# Patient Record
Sex: FEMALE | Race: WHITE | Employment: FULL TIME | URBAN - METROPOLITAN AREA
[De-identification: names, ages, dates, MRNs, and addresses within clinical notes are randomized per-mention and may not be internally consistent; named-entity substitution may affect disease eponyms.]

---

## 2019-05-24 ENCOUNTER — HOSPITAL ENCOUNTER (OUTPATIENT)
Dept: MAMMOGRAPHY | Age: 49
Discharge: HOME | End: 2019-05-24
Attending: NURSE PRACTITIONER
Payer: COMMERCIAL

## 2019-05-24 DIAGNOSIS — Z12.31 VISIT FOR SCREENING MAMMOGRAM: ICD-10-CM

## 2019-05-24 PROCEDURE — 87624 HPV HI-RISK TYP POOLED RSLT: CPT | Performed by: NURSE PRACTITIONER

## 2019-05-24 PROCEDURE — 88175 CYTOPATH C/V AUTO FLUID REDO: CPT | Performed by: NURSE PRACTITIONER

## 2019-05-24 PROCEDURE — 77063 BREAST TOMOSYNTHESIS BI: CPT | Performed by: NURSE PRACTITIONER

## 2019-05-24 PROCEDURE — 77067 SCR MAMMO BI INCL CAD: CPT | Performed by: NURSE PRACTITIONER

## 2019-07-17 ENCOUNTER — HOSPITAL ENCOUNTER (OUTPATIENT)
Dept: MAMMOGRAPHY | Facility: HOSPITAL | Age: 49
Discharge: HOME | End: 2019-07-17
Attending: NURSE PRACTITIONER
Payer: COMMERCIAL

## 2019-07-17 DIAGNOSIS — R92.2 INCONCLUSIVE MAMMOGRAM: ICD-10-CM

## 2019-07-17 PROCEDURE — 77065 DX MAMMO INCL CAD UNI: CPT | Performed by: NURSE PRACTITIONER

## 2019-07-17 PROCEDURE — 76642 ULTRASOUND BREAST LIMITED: CPT | Performed by: NURSE PRACTITIONER

## 2019-07-17 PROCEDURE — 77061 BREAST TOMOSYNTHESIS UNI: CPT | Performed by: NURSE PRACTITIONER

## 2020-06-26 DIAGNOSIS — Z78.9 PARTICIPANT IN HEALTH AND WELLNESS PLAN: Primary | ICD-10-CM

## 2020-07-02 ENCOUNTER — NURSE ONLY (OUTPATIENT)
Dept: LAB | Age: 50
End: 2020-07-02
Attending: PREVENTIVE MEDICINE
Payer: COMMERCIAL

## 2020-07-02 DIAGNOSIS — Z78.9 PARTICIPANT IN HEALTH AND WELLNESS PLAN: ICD-10-CM

## 2020-07-02 LAB — SARS-COV-2 IGG SERPLBLD QL IA.RAPID: NEGATIVE

## 2020-07-02 PROCEDURE — 86769 SARS-COV-2 COVID-19 ANTIBODY: CPT

## 2020-08-01 ENCOUNTER — HOSPITAL ENCOUNTER (OUTPATIENT)
Dept: MAMMOGRAPHY | Facility: HOSPITAL | Age: 50
Discharge: HOME | End: 2020-08-01
Attending: NURSE PRACTITIONER
Payer: COMMERCIAL

## 2020-08-01 DIAGNOSIS — Z12.31 ENCOUNTER FOR SCREENING MAMMOGRAM FOR MALIGNANT NEOPLASM OF BREAST: ICD-10-CM

## 2020-08-06 ENCOUNTER — HOSPITAL ENCOUNTER (OUTPATIENT)
Dept: MAMMOGRAPHY | Facility: HOSPITAL | Age: 50
Discharge: HOME | End: 2020-08-06
Attending: NURSE PRACTITIONER
Payer: COMMERCIAL

## 2020-08-06 PROCEDURE — 77063 BREAST TOMOSYNTHESIS BI: CPT | Performed by: NURSE PRACTITIONER

## 2020-08-06 PROCEDURE — 77067 SCR MAMMO BI INCL CAD: CPT | Performed by: NURSE PRACTITIONER

## 2020-09-15 ENCOUNTER — APPOINTMENT (OUTPATIENT)
Dept: GENERAL RADIOLOGY | Facility: HOSPITAL | Age: 50
End: 2020-09-15
Attending: EMERGENCY MEDICINE
Payer: COMMERCIAL

## 2020-09-15 ENCOUNTER — HOSPITAL ENCOUNTER (EMERGENCY)
Facility: HOSPITAL | Age: 50
Discharge: HOME | End: 2020-09-15
Attending: EMERGENCY MEDICINE
Payer: COMMERCIAL

## 2020-09-15 VITALS
TEMPERATURE: 97 F | HEIGHT: 63.6 IN | OXYGEN SATURATION: 100 % | HEART RATE: 56 BPM | DIASTOLIC BLOOD PRESSURE: 90 MMHG | BODY MASS INDEX: 29.38 KG/M2 | WEIGHT: 170 LBS | SYSTOLIC BLOOD PRESSURE: 157 MMHG | RESPIRATION RATE: 16 BRPM

## 2020-09-15 DIAGNOSIS — S63.91XA SPRAIN OF RIGHT HAND, INITIAL ENCOUNTER: Primary | ICD-10-CM

## 2020-09-15 PROCEDURE — 99283 EMERGENCY DEPT VISIT LOW MDM: CPT

## 2020-09-15 PROCEDURE — 73130 X-RAY EXAM OF HAND: CPT | Performed by: EMERGENCY MEDICINE

## 2020-09-15 RX ORDER — IBUPROFEN 600 MG/1
600 TABLET ORAL EVERY 8 HOURS PRN
Qty: 30 TABLET | Refills: 0 | Status: SHIPPED | OUTPATIENT
Start: 2020-09-15 | End: 2020-09-25

## 2020-09-15 NOTE — ED PROVIDER NOTES
Patient Seen in: [facility] Emergency Department      History   Patient presents with:  Upper Extremity Injury    Stated Complaint: fall right hand pain    HPI    47-year-old female presents emergency department complaining of right first hand and s Stethoscope and any equipment used during my examination was cleaned with super sani-cloth germicidal wipes following the exam.         Vital signs reviewed  General appearance: Patient is alert and in no acute distress  HEENT: Pupils equal react to light x-ray showed no acute fracture. Will put her in a wrist splint with thumb spica for some support as I do feel she strained her ligament in her thumb.   Patient will continue to take anti-inflammatories supportive care        MDM     Patient was made aware

## 2020-12-19 ENCOUNTER — IMMUNIZATION (OUTPATIENT)
Dept: LAB | Facility: HOSPITAL | Age: 50
End: 2020-12-19
Attending: PREVENTIVE MEDICINE
Payer: COMMERCIAL

## 2020-12-19 DIAGNOSIS — Z23 NEED FOR VACCINATION: ICD-10-CM

## 2020-12-19 PROCEDURE — 0001A PFIZER-BIONTECH COVID-19 VACCINE: CPT

## 2021-01-09 ENCOUNTER — IMMUNIZATION (OUTPATIENT)
Dept: LAB | Facility: HOSPITAL | Age: 51
End: 2021-01-09
Attending: PREVENTIVE MEDICINE

## 2021-01-09 DIAGNOSIS — Z23 NEED FOR VACCINATION: ICD-10-CM

## 2021-01-09 PROCEDURE — 0002A SARSCOV2 VAC 30MCG/0.3ML IM: CPT

## 2021-09-21 ENCOUNTER — HOSPITAL ENCOUNTER (OUTPATIENT)
Dept: GENERAL RADIOLOGY | Age: 51
Discharge: HOME | End: 2021-09-21
Attending: FAMILY MEDICINE
Payer: COMMERCIAL

## 2021-09-21 ENCOUNTER — LAB ENCOUNTER (OUTPATIENT)
Dept: LAB | Age: 51
End: 2021-09-21
Attending: FAMILY MEDICINE
Payer: COMMERCIAL

## 2021-09-21 DIAGNOSIS — Z00.00 ROUTINE GENERAL MEDICAL EXAMINATION AT A HEALTH CARE FACILITY: ICD-10-CM

## 2021-09-21 DIAGNOSIS — M25.542 ARTHRALGIA OF BOTH HANDS: ICD-10-CM

## 2021-09-21 DIAGNOSIS — R53.83 OTHER FATIGUE: ICD-10-CM

## 2021-09-21 DIAGNOSIS — M25.541 ARTHRALGIA OF BOTH HANDS: ICD-10-CM

## 2021-09-21 DIAGNOSIS — E55.9 VITAMIN D DEFICIENCY: ICD-10-CM

## 2021-09-21 LAB
ALT SERPL-CCNC: 24 U/L
ANION GAP SERPL CALC-SCNC: 4 MMOL/L (ref 0–18)
BUN BLD-MCNC: 16 MG/DL (ref 7–18)
CALCIUM BLD-MCNC: 9 MG/DL (ref 8.5–10.1)
CHLORIDE SERPL-SCNC: 107 MMOL/L (ref 98–112)
CHOLEST SERPL-MCNC: 220 MG/DL (ref ?–200)
CO2 SERPL-SCNC: 26 MMOL/L (ref 21–32)
CREAT BLD-MCNC: 0.78 MG/DL
CRP SERPL-MCNC: 0.36 MG/DL (ref ?–0.3)
ERYTHROCYTE [DISTWIDTH] IN BLOOD BY AUTOMATED COUNT: 14 %
GLUCOSE BLD-MCNC: 115 MG/DL (ref 70–99)
HCT VFR BLD AUTO: 43.9 %
HDLC SERPL-MCNC: 37 MG/DL (ref 40–59)
HGB BLD-MCNC: 14.6 G/DL
LDLC SERPL CALC-MCNC: 136 MG/DL (ref ?–100)
MCH RBC QN AUTO: 29 PG (ref 26–34)
MCHC RBC AUTO-ENTMCNC: 33.3 G/DL (ref 31–37)
MCV RBC AUTO: 87.1 FL
NONHDLC SERPL-MCNC: 183 MG/DL (ref ?–130)
OSMOLALITY SERPL CALC.SUM OF ELEC: 286 MOSM/KG (ref 275–295)
PATIENT FASTING Y/N/NP: YES
PATIENT FASTING Y/N/NP: YES
PLATELET # BLD AUTO: 296 10(3)UL (ref 150–450)
POTASSIUM SERPL-SCNC: 4.3 MMOL/L (ref 3.5–5.1)
RBC # BLD AUTO: 5.04 X10(6)UL
SED RATE-ML: 19 MM/HR
SODIUM SERPL-SCNC: 137 MMOL/L (ref 136–145)
TRIGL SERPL-MCNC: 260 MG/DL (ref 30–149)
VIT D+METAB SERPL-MCNC: 25.5 NG/ML (ref 30–100)
VLDLC SERPL CALC-MCNC: 48 MG/DL (ref 0–30)
WBC # BLD AUTO: 6.2 X10(3) UL (ref 4–11)

## 2021-09-21 PROCEDURE — 85027 COMPLETE CBC AUTOMATED: CPT

## 2021-09-21 PROCEDURE — 82306 VITAMIN D 25 HYDROXY: CPT

## 2021-09-21 PROCEDURE — 85652 RBC SED RATE AUTOMATED: CPT

## 2021-09-21 PROCEDURE — 36415 COLL VENOUS BLD VENIPUNCTURE: CPT

## 2021-09-21 PROCEDURE — 73130 X-RAY EXAM OF HAND: CPT | Performed by: FAMILY MEDICINE

## 2021-09-21 PROCEDURE — 80048 BASIC METABOLIC PNL TOTAL CA: CPT

## 2021-09-21 PROCEDURE — 80061 LIPID PANEL: CPT

## 2021-09-21 PROCEDURE — 84460 ALANINE AMINO (ALT) (SGPT): CPT

## 2021-09-21 PROCEDURE — 86140 C-REACTIVE PROTEIN: CPT

## 2021-12-14 ENCOUNTER — HOSPITAL ENCOUNTER (OUTPATIENT)
Dept: MAMMOGRAPHY | Age: 51
Discharge: HOME | End: 2021-12-14
Attending: NURSE PRACTITIONER
Payer: COMMERCIAL

## 2021-12-14 ENCOUNTER — LAB ENCOUNTER (OUTPATIENT)
Dept: LAB | Age: 51
End: 2021-12-14
Attending: FAMILY MEDICINE
Payer: COMMERCIAL

## 2021-12-14 DIAGNOSIS — Z12.31 ENCOUNTER FOR SCREENING MAMMOGRAM FOR MALIGNANT NEOPLASM OF BREAST: ICD-10-CM

## 2021-12-14 DIAGNOSIS — E55.9 VITAMIN D DEFICIENCY: ICD-10-CM

## 2021-12-14 DIAGNOSIS — R79.9 ABNORMAL BLOOD CHEMISTRY: ICD-10-CM

## 2021-12-14 PROCEDURE — 86140 C-REACTIVE PROTEIN: CPT

## 2021-12-14 PROCEDURE — 77063 BREAST TOMOSYNTHESIS BI: CPT | Performed by: NURSE PRACTITIONER

## 2021-12-14 PROCEDURE — 82306 VITAMIN D 25 HYDROXY: CPT

## 2021-12-14 PROCEDURE — 83036 HEMOGLOBIN GLYCOSYLATED A1C: CPT

## 2021-12-14 PROCEDURE — 36415 COLL VENOUS BLD VENIPUNCTURE: CPT

## 2021-12-14 PROCEDURE — 82947 ASSAY GLUCOSE BLOOD QUANT: CPT

## 2021-12-14 PROCEDURE — 77067 SCR MAMMO BI INCL CAD: CPT | Performed by: NURSE PRACTITIONER

## 2022-01-11 ENCOUNTER — LAB ENCOUNTER (OUTPATIENT)
Dept: LAB | Facility: HOSPITAL | Age: 52
End: 2022-01-11
Attending: PREVENTIVE MEDICINE
Payer: COMMERCIAL

## 2022-01-11 ENCOUNTER — TELEPHONE (OUTPATIENT)
Dept: INTERNAL MEDICINE CLINIC | Facility: HOSPITAL | Age: 52
End: 2022-01-11

## 2022-01-11 DIAGNOSIS — Z20.822 SUSPECTED COVID-19 VIRUS INFECTION: ICD-10-CM

## 2022-01-11 DIAGNOSIS — Z20.822 SUSPECTED COVID-19 VIRUS INFECTION: Primary | ICD-10-CM

## 2022-01-11 LAB — SARS-COV-2 RNA RESP QL NAA+PROBE: DETECTED

## 2022-01-11 NOTE — TELEPHONE ENCOUNTER
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 days  When was the last shift you worked? 1/10/2022  When are you next scheduled to work?  1/14/2022    Did you have close contact with someone on your unit while not wearing a mask? (e.g., kristal Follow up for condition update when resulting  []  If symptoms develop, stay home and call hotline for rapid test order  [x]  If COVID positive results, off work minimum of 5 days from positive test or onset of symptoms (day 0)    [x]      On day 5, if asy

## 2022-01-12 NOTE — TELEPHONE ENCOUNTER
Left VM on 1/12/22 at 0840 to go over RTW instructions. Results and RTW guidelines:    COVID RESULT:    [x] Viewed by employee in [address] RTW plan and instructions as indicated on triage call. Manager notified.   Estimated RTW date: 1/16/22  [x] Disc if asymptomatic or mildly symptomatic (with improving symptoms) may return to work day 6          On day 5, if symptomatic, call Employee Health for RTW screening        []  COVID positive result - call Employee Health on day 5 after symptom onset.   The em

## 2022-05-20 ENCOUNTER — LAB ENCOUNTER (OUTPATIENT)
Dept: LAB | Facility: HOSPITAL | Age: 52
End: 2022-05-20
Attending: INTERNAL MEDICINE
Payer: COMMERCIAL

## 2022-05-20 DIAGNOSIS — Z01.818 PREOP TESTING: ICD-10-CM

## 2022-05-20 LAB — SARS-COV-2 RNA RESP QL NAA+PROBE: NOT DETECTED

## 2022-05-23 PROBLEM — Z12.11 SPECIAL SCREENING FOR MALIGNANT NEOPLASMS, COLON: Status: ACTIVE | Noted: 2022-05-23

## 2022-05-23 PROBLEM — K64.8 INTERNAL HEMORRHOIDS: Status: ACTIVE | Noted: 2022-05-23

## 2023-01-10 ENCOUNTER — OFFICE VISIT (OUTPATIENT)
Dept: PODIATRY CLINIC | Facility: CLINIC | Age: 53
End: 2023-01-10
Payer: COMMERCIAL

## 2023-01-10 DIAGNOSIS — M65.28 CALCIFIC ACHILLES TENDINITIS OF LEFT LOWER EXTREMITY: ICD-10-CM

## 2023-01-10 DIAGNOSIS — M77.52 RETROCALCANEAL BURSITIS, LEFT: ICD-10-CM

## 2023-01-10 DIAGNOSIS — M79.672 LEFT FOOT PAIN: Primary | ICD-10-CM

## 2023-01-10 DIAGNOSIS — M79.672 INFLAMMATORY HEEL PAIN, LEFT: ICD-10-CM

## 2023-01-10 RX ORDER — METHYLPREDNISOLONE 4 MG/1
TABLET ORAL
Qty: 21 TABLET | Refills: 0 | Status: SHIPPED | OUTPATIENT
Start: 2023-01-10

## 2023-02-01 ENCOUNTER — TELEPHONE (OUTPATIENT)
Dept: PHYSICAL THERAPY | Facility: HOSPITAL | Age: 53
End: 2023-02-01

## 2023-02-27 ENCOUNTER — TELEPHONE (OUTPATIENT)
Dept: PHYSICAL THERAPY | Facility: HOSPITAL | Age: 53
End: 2023-02-27

## 2023-02-28 ENCOUNTER — TELEPHONE (OUTPATIENT)
Dept: PHYSICAL THERAPY | Facility: HOSPITAL | Age: 53
End: 2023-02-28

## 2023-02-28 ENCOUNTER — OFFICE VISIT (OUTPATIENT)
Dept: PHYSICAL THERAPY | Facility: HOSPITAL | Age: 53
End: 2023-02-28
Attending: PODIATRIST
Payer: COMMERCIAL

## 2023-02-28 DIAGNOSIS — M79.672 LEFT FOOT PAIN: ICD-10-CM

## 2023-02-28 DIAGNOSIS — M77.52 RETROCALCANEAL BURSITIS, LEFT: ICD-10-CM

## 2023-02-28 DIAGNOSIS — M79.672 INFLAMMATORY HEEL PAIN, LEFT: ICD-10-CM

## 2023-02-28 DIAGNOSIS — M65.28 CALCIFIC ACHILLES TENDINITIS OF LEFT LOWER EXTREMITY: ICD-10-CM

## 2023-02-28 PROCEDURE — 97162 PT EVAL MOD COMPLEX 30 MIN: CPT

## 2023-02-28 PROCEDURE — 97110 THERAPEUTIC EXERCISES: CPT

## 2023-03-02 ENCOUNTER — APPOINTMENT (OUTPATIENT)
Dept: PHYSICAL THERAPY | Facility: HOSPITAL | Age: 53
End: 2023-03-02
Attending: PODIATRIST
Payer: COMMERCIAL

## 2023-03-02 ENCOUNTER — OFFICE VISIT (OUTPATIENT)
Dept: PHYSICAL THERAPY | Facility: HOSPITAL | Age: 53
End: 2023-03-02
Attending: FAMILY MEDICINE
Payer: COMMERCIAL

## 2023-03-02 PROCEDURE — 97110 THERAPEUTIC EXERCISES: CPT

## 2023-03-02 PROCEDURE — 97140 MANUAL THERAPY 1/> REGIONS: CPT

## 2023-03-07 ENCOUNTER — OFFICE VISIT (OUTPATIENT)
Dept: PHYSICAL THERAPY | Facility: HOSPITAL | Age: 53
End: 2023-03-07
Attending: PODIATRIST
Payer: COMMERCIAL

## 2023-03-07 PROCEDURE — 97110 THERAPEUTIC EXERCISES: CPT

## 2023-03-07 PROCEDURE — 97140 MANUAL THERAPY 1/> REGIONS: CPT

## 2023-03-09 ENCOUNTER — OFFICE VISIT (OUTPATIENT)
Dept: PHYSICAL THERAPY | Facility: HOSPITAL | Age: 53
End: 2023-03-09
Attending: PODIATRIST
Payer: COMMERCIAL

## 2023-03-09 PROCEDURE — 97110 THERAPEUTIC EXERCISES: CPT

## 2023-03-09 PROCEDURE — 97140 MANUAL THERAPY 1/> REGIONS: CPT

## 2023-03-09 NOTE — ED INITIAL ASSESSMENT (HPI)
Pt here with right first and second digit pain after falling while feeding the dogs. Denies hitting head denies loc.no deformity noted. ice pack intact.  Ibuprofen 800mg PO at home Detail Level: Zone Text: The above diagnosis and findings were noted on the exam but not discussed at this time with the patient.

## 2023-03-14 ENCOUNTER — TELEPHONE (OUTPATIENT)
Dept: PHYSICAL THERAPY | Facility: HOSPITAL | Age: 53
End: 2023-03-14

## 2023-03-14 ENCOUNTER — APPOINTMENT (OUTPATIENT)
Dept: PHYSICAL THERAPY | Facility: HOSPITAL | Age: 53
End: 2023-03-14
Attending: PODIATRIST
Payer: COMMERCIAL

## 2023-03-21 ENCOUNTER — OFFICE VISIT (OUTPATIENT)
Dept: PHYSICAL THERAPY | Facility: HOSPITAL | Age: 53
End: 2023-03-21
Attending: PODIATRIST
Payer: COMMERCIAL

## 2023-03-21 PROCEDURE — 97110 THERAPEUTIC EXERCISES: CPT

## 2023-03-21 PROCEDURE — 97140 MANUAL THERAPY 1/> REGIONS: CPT

## 2023-03-23 ENCOUNTER — OFFICE VISIT (OUTPATIENT)
Dept: PHYSICAL THERAPY | Facility: HOSPITAL | Age: 53
End: 2023-03-23
Attending: PODIATRIST
Payer: COMMERCIAL

## 2023-03-23 PROCEDURE — 97110 THERAPEUTIC EXERCISES: CPT

## 2023-06-21 ENCOUNTER — HOSPITAL ENCOUNTER (OUTPATIENT)
Dept: MAMMOGRAPHY | Age: 53
Discharge: HOME | End: 2023-06-21
Payer: COMMERCIAL

## 2023-06-21 DIAGNOSIS — Z12.31 ENCOUNTER FOR SCREENING MAMMOGRAM FOR BREAST CANCER: ICD-10-CM

## 2023-06-21 PROCEDURE — 87624 HPV HI-RISK TYP POOLED RSLT: CPT

## 2023-06-21 PROCEDURE — 77063 BREAST TOMOSYNTHESIS BI: CPT

## 2023-06-21 PROCEDURE — 77067 SCR MAMMO BI INCL CAD: CPT

## 2024-05-08 ENCOUNTER — OFFICE VISIT (OUTPATIENT)
Dept: PODIATRY CLINIC | Facility: CLINIC | Age: 54
End: 2024-05-08

## 2024-05-08 DIAGNOSIS — M65.28 CALCIFIC ACHILLES TENDINITIS OF LEFT LOWER EXTREMITY: Primary | ICD-10-CM

## 2024-05-08 DIAGNOSIS — M77.52 RETROCALCANEAL BURSITIS, LEFT: ICD-10-CM

## 2024-05-08 DIAGNOSIS — M79.672 LEFT FOOT PAIN: ICD-10-CM

## 2024-05-08 PROCEDURE — 99213 OFFICE O/P EST LOW 20 MIN: CPT | Performed by: STUDENT IN AN ORGANIZED HEALTH CARE EDUCATION/TRAINING PROGRAM

## 2024-05-10 ENCOUNTER — TELEPHONE (OUTPATIENT)
Dept: PODIATRY CLINIC | Facility: CLINIC | Age: 54
End: 2024-05-10

## 2024-05-10 NOTE — TELEPHONE ENCOUNTER
Per patient Dr. Kang wants her to get an mri of her foot and central scheduling does not have an order. Please advise

## 2024-05-13 NOTE — PROGRESS NOTES
[facility] Podiatry  Progress Note    Kristen Hooks is a 53 year old female.   Chief Complaint   Patient presents with    Heel Pain     Consult- L heel - onset- 2 yrs ago- foot hit by a trailer- was seen by PT with improvement but still painful- rates pain 10/10 on and off- was seen by Dr. Ernandez in 2023         HPI:     Patient is a very pleasant 53-year-old female presents to clinic for evaluation of chronic left heel pain.  She has pain in her Achilles tendon insertion has been ongoing for the last 2+ years.  Her foot was hit by a trailer in the past.  She has completed physical therapy in the past but her symptoms remain painful.  She cannot complete activities that she would like to.  She rates her pain a 10 out of 10 on and off.  She does feel limited by her continued pain and is wondering what other treatment options are available.  No other complaints are mentioned.  Past medical history, medications, and allergies reviewed.      Allergies: Flu virus vaccine   Current Outpatient Medications   Medication Sig Dispense Refill    methylPREDNISolone 4 MG Oral Tablet Therapy Pack Take per package insert (instructions). 21 tablet 0    ergocalciferol 1.25 MG (98870 UT) Oral Cap Take 1 capsule (50,000 Units total) by mouth once a week. (Patient not taking: Reported on 1/10/2023) 4 capsule 2    sertraline 50 MG Oral Tab Take 1 tablet (50 mg total) by mouth daily. 90 tablet 3    Cholecalciferol (VITAMIN D) 50 MCG (2000 UT) Oral Cap Take by mouth.        Past Medical History:    Anxiety    H. pylori infection    Headache disorder    Heartburn    Hemorrhoids    Stress    Wears glasses      Past Surgical History:   Procedure Laterality Date    Other surgical history  2022    colonscopy      Family History   Problem Relation Age of Onset    Heart Disorder Father     Diabetes Father     Heart Attack Father     Other (Other) Father 69        parkinsons    Cancer Mother         non-hodgkins lymphoma     Hypertension Mother     Prostate Cancer Brother       Social History     Socioeconomic History    Marital status:    Tobacco Use    Smoking status: Never    Smokeless tobacco: Never   Vaping Use    Vaping status: Never Used   Substance and Sexual Activity    Alcohol use: Yes     Alcohol/week: 3.0 standard drinks of alcohol     Types: 3 Cans of beer per week    Drug use: No    Sexual activity: Yes     Partners: Male           REVIEW OF SYSTEMS:     Today reviewed systems as documented below  GENERAL HEALTH: feels well otherwise  SKIN: denies any unusual skin lesions or rashes  RESPIRATORY: denies shortness of breath with exertion  CARDIOVASCULAR: denies chest pain on exertion  GI: denies abdominal pain and denies heartburn  NEURO: denies headaches  MUSCULO: denies arthritis, back pain      EXAM:   LMP 05/20/2023 (Approximate)   GENERAL: well developed, well nourished, in no apparent distress  EXTREMITIES:   1. Integument: Normal skin temperature and turgor  2. Vascular: Dorsalis pedis two out of four bilateral and posterior tibial pulses two out of   four bilateral, capillary refill normal.   3. Musculoskeletal: All muscle groups are graded 5 out of 5 in the foot and ankle.  Pain noted to Achilles tendon insertion of left lower extremity.  Palpable exostosis and posterior superior lateral eminence to the left calcaneus.  Compartments are soft and compressible.  No palpable dell or strength deficits.   4. Neurological: Normal sharp dull sensation; reflexes normal.    Left foot x-rays: 5/8/2024:    Insertional enthesophyte to posterior calcaneus with additional posterior superior eminence consistent with Elliot's deformity. No acute fracture or osseous abnormality appreciated.        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  -Discussed etiology of condition and various treatment options.  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  She has continued pain despite conservative treatment options such as physical therapy, activity modification, anti-inflammatories, and shoe gear modification.  -X-rays do show insertional enthesophyte and Elliot's deformity.  -Will order MRI for further evaluation.  -Because of patient's fairly significant pain has been ongoing for 2+ years, she may benefit from surgical intervention.  Discussed Achilles detach/reattach with calcaneal exostectomy including benefits, risks, recovery period.  -Patient may be interested in this.  Will further discuss once MRI is complete.  -All questions were answered to satisfaction.    The patient indicates understanding of these issues and agrees to the plan.        Alexei Kang DPM    Dragon speech recognition software was used to prepare this note.  Errors in word recognition may occur.  Please contact me with any questions/concerns with this note.

## 2024-05-15 ENCOUNTER — HOSPITAL ENCOUNTER (OUTPATIENT)
Dept: MRI IMAGING | Age: 54
Discharge: HOME | End: 2024-05-15
Attending: STUDENT IN AN ORGANIZED HEALTH CARE EDUCATION/TRAINING PROGRAM

## 2024-05-15 DIAGNOSIS — M77.52 RETROCALCANEAL BURSITIS, LEFT: ICD-10-CM

## 2024-05-15 DIAGNOSIS — M65.28 CALCIFIC ACHILLES TENDINITIS OF LEFT LOWER EXTREMITY: ICD-10-CM

## 2024-05-15 PROCEDURE — 73721 MRI JNT OF LWR EXTRE W/O DYE: CPT | Performed by: STUDENT IN AN ORGANIZED HEALTH CARE EDUCATION/TRAINING PROGRAM

## 2024-05-23 ENCOUNTER — TELEPHONE (OUTPATIENT)
Dept: PODIATRY CLINIC | Facility: CLINIC | Age: 54
End: 2024-05-23

## 2024-05-23 DIAGNOSIS — M79.672 INFLAMMATORY HEEL PAIN, LEFT: ICD-10-CM

## 2024-05-23 DIAGNOSIS — M65.28 CALCIFIC ACHILLES TENDINITIS OF LEFT LOWER EXTREMITY: Primary | ICD-10-CM

## 2024-05-23 DIAGNOSIS — M77.52 RETROCALCANEAL BURSITIS, LEFT: ICD-10-CM

## 2024-05-23 DIAGNOSIS — M92.62 HAGLUND'S DEFORMITY, LEFT: ICD-10-CM

## 2024-05-24 NOTE — TELEPHONE ENCOUNTER
Procedure: Achilles detach/reattach with calcaneal exostectomy  CPT code: 84497  Length of Surgery: 2 hours  Any Instruments: podiatry tray, small power, mini fluoro, harvey speedbridge  Call patient: ASAP  Anesthesia: Gen  will need to be prone  Location: [facility]  Assistance: none  Pacemaker: No  Anticoagulants: No  Nickel Allergy: No  Latex Allergy: No  Diagnosis/ICD Code:     ICD-10-CM    1. 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 Retrocalcaneal bursitis, left  M77.52       3. Inflammatory heel pain, left  M79.672       4. Elliot's deformity, left  M92.62

## 2024-05-30 DIAGNOSIS — M92.62 HAGLUND'S DEFORMITY, LEFT: ICD-10-CM

## 2024-05-30 DIAGNOSIS — M65.28 CALCIFIC ACHILLES TENDINITIS OF LEFT LOWER EXTREMITY: Primary | ICD-10-CM

## 2024-05-30 DIAGNOSIS — M77.52 RETROCALCANEAL BURSITIS, LEFT: ICD-10-CM

## 2024-05-30 DIAGNOSIS — M79.672 INFLAMMATORY HEEL PAIN, LEFT: ICD-10-CM

## 2024-06-20 ENCOUNTER — LAB ENCOUNTER (OUTPATIENT)
Dept: LAB | Facility: HOSPITAL | Age: 54
End: 2024-06-20
Attending: FAMILY MEDICINE

## 2024-06-20 DIAGNOSIS — Z13.220 SCREENING FOR LIPOID DISORDERS: ICD-10-CM

## 2024-06-20 DIAGNOSIS — Z13.228 SCREENING FOR PHENYLKETONURIA (PKU): Primary | ICD-10-CM

## 2024-06-20 DIAGNOSIS — Z13.29 SCREENING FOR THYROID DISORDER: ICD-10-CM

## 2024-06-20 DIAGNOSIS — Z13.0 SCREENING FOR IRON DEFICIENCY ANEMIA: ICD-10-CM

## 2024-06-20 DIAGNOSIS — E55.9 AVITAMINOSIS D: ICD-10-CM

## 2024-06-20 LAB
ALBUMIN SERPL-MCNC: 3.8 G/DL (ref 3.4–5)
ALBUMIN/GLOB SERPL: 0.9 {RATIO} (ref 1–2)
ALP LIVER SERPL-CCNC: 105 U/L
ALT SERPL-CCNC: 30 U/L
ANION GAP SERPL CALC-SCNC: 5 MMOL/L (ref 0–18)
AST SERPL-CCNC: 20 U/L (ref 15–37)
BASOPHILS # BLD AUTO: 0.03 X10(3) UL (ref 0–0.2)
BASOPHILS NFR BLD AUTO: 0.5 %
BILIRUB SERPL-MCNC: 0.5 MG/DL (ref 0.1–2)
BUN BLD-MCNC: 17 MG/DL (ref 9–23)
CALCIUM BLD-MCNC: 9.1 MG/DL (ref 8.5–10.1)
CHLORIDE SERPL-SCNC: 106 MMOL/L (ref 98–112)
CHOLEST SERPL-MCNC: 218 MG/DL (ref ?–200)
CO2 SERPL-SCNC: 27 MMOL/L (ref 21–32)
CREAT BLD-MCNC: 0.88 MG/DL
EGFRCR SERPLBLD CKD-EPI 2021: 80 ML/MIN/1.73M2 (ref 60–?)
EOSINOPHIL # BLD AUTO: 0.11 X10(3) UL (ref 0–0.7)
EOSINOPHIL NFR BLD AUTO: 2 %
ERYTHROCYTE [DISTWIDTH] IN BLOOD BY AUTOMATED COUNT: 13.8 %
EST. AVERAGE GLUCOSE BLD GHB EST-MCNC: 131 MG/DL (ref 68–126)
FASTING PATIENT LIPID ANSWER: YES
FASTING STATUS PATIENT QL REPORTED: YES
GLOBULIN PLAS-MCNC: 4.4 G/DL (ref 2.8–4.4)
GLUCOSE BLD-MCNC: 119 MG/DL (ref 70–99)
HBA1C MFR BLD: 6.2 % (ref ?–5.7)
HCT VFR BLD AUTO: 44.3 %
HDLC SERPL-MCNC: 39 MG/DL (ref 40–59)
HGB BLD-MCNC: 15.4 G/DL
IMM GRANULOCYTES # BLD AUTO: 0.01 X10(3) UL (ref 0–1)
IMM GRANULOCYTES NFR BLD: 0.2 %
LDLC SERPL CALC-MCNC: 132 MG/DL (ref ?–100)
LYMPHOCYTES # BLD AUTO: 1.72 X10(3) UL (ref 1–4)
LYMPHOCYTES NFR BLD AUTO: 30.5 %
MCH RBC QN AUTO: 30.4 PG (ref 26–34)
MCHC RBC AUTO-ENTMCNC: 34.8 G/DL (ref 31–37)
MCV RBC AUTO: 87.4 FL
MONOCYTES # BLD AUTO: 0.38 X10(3) UL (ref 0.1–1)
MONOCYTES NFR BLD AUTO: 6.7 %
NEUTROPHILS # BLD AUTO: 3.39 X10 (3) UL (ref 1.5–7.7)
NEUTROPHILS # BLD AUTO: 3.39 X10(3) UL (ref 1.5–7.7)
NEUTROPHILS NFR BLD AUTO: 60.1 %
NONHDLC SERPL-MCNC: 179 MG/DL (ref ?–130)
OSMOLALITY SERPL CALC.SUM OF ELEC: 289 MOSM/KG (ref 275–295)
PLATELET # BLD AUTO: 290 10(3)UL (ref 150–450)
POTASSIUM SERPL-SCNC: 4.5 MMOL/L (ref 3.5–5.1)
PROT SERPL-MCNC: 8.2 G/DL (ref 6.4–8.2)
RBC # BLD AUTO: 5.07 X10(6)UL
SODIUM SERPL-SCNC: 138 MMOL/L (ref 136–145)
TRIGL SERPL-MCNC: 263 MG/DL (ref 30–149)
TSI SER-ACNC: 1.29 MIU/ML (ref 0.36–3.74)
VIT D+METAB SERPL-MCNC: 30.7 NG/ML (ref 30–100)
VLDLC SERPL CALC-MCNC: 48 MG/DL (ref 0–30)
WBC # BLD AUTO: 5.6 X10(3) UL (ref 4–11)

## 2024-06-20 PROCEDURE — 83036 HEMOGLOBIN GLYCOSYLATED A1C: CPT

## 2024-06-20 PROCEDURE — 80053 COMPREHEN METABOLIC PANEL: CPT

## 2024-06-20 PROCEDURE — 80061 LIPID PANEL: CPT

## 2024-06-20 PROCEDURE — 82306 VITAMIN D 25 HYDROXY: CPT

## 2024-06-20 PROCEDURE — 36415 COLL VENOUS BLD VENIPUNCTURE: CPT

## 2024-06-20 PROCEDURE — 84443 ASSAY THYROID STIM HORMONE: CPT

## 2024-06-20 PROCEDURE — 85025 COMPLETE CBC W/AUTO DIFF WBC: CPT

## 2024-07-15 ENCOUNTER — TELEPHONE (OUTPATIENT)
Dept: PODIATRY CLINIC | Facility: CLINIC | Age: 54
End: 2024-07-15

## 2024-07-19 NOTE — TELEPHONE ENCOUNTER
Family Medical Leave Act received in forms department. Logged for processing. Teamleader message sent for auth.

## 2024-07-25 NOTE — TELEPHONE ENCOUNTER
Type of Leave: Continuous  Reason for Leave: sx 9/3/24 Achilles detach  Start date of leave: 9/3/24- 3 months  How much time needed?: 3 months  Forms Due Date: asap  Was Fee and Turnaround info Given?: yes

## 2024-07-26 NOTE — TELEPHONE ENCOUNTER
Dr. Kang,     *The ACKNOWLEDGE button has been moved to the top right ribbon*    Please sign off on form if you agree to:Continuous Family Medical Leave Act from 9/3/24 to 3 months pending re-eval due to Achilles detach/reattach w/calcaneal exostectomy sx 9/3/24.  (place your signature on the first page only)    -From your Inbasket, Highlight the patient and click Chart   -Double click the 7/15/24 Forms Completion telephone encounter  -Scroll down to the Media section   -Click the blue Hyperlink: Family Medical Leave Act, Dr. Kang, 7/26/24   -Click Acknowledge located in the top right ribbon/menu   -Drag the mouse into the blank space of the document and a + sign will appear. Left click to   electronically sign the document.     Thank you,  Brenda DERAS

## 2024-07-26 NOTE — TELEPHONE ENCOUNTER
Patient called to confirm Release of Information was received. Valid Release of Information received via email and scanned to patient chart. Patient expressed understanding.

## 2024-08-08 ENCOUNTER — HOSPITAL ENCOUNTER (OUTPATIENT)
Dept: MAMMOGRAPHY | Age: 54
Discharge: HOME | End: 2024-08-08
Attending: FAMILY MEDICINE
Payer: COMMERCIAL

## 2024-08-08 DIAGNOSIS — Z12.31 ENCOUNTER FOR SCREENING MAMMOGRAM FOR MALIGNANT NEOPLASM OF BREAST: ICD-10-CM

## 2024-08-08 PROCEDURE — 77067 SCR MAMMO BI INCL CAD: CPT | Performed by: FAMILY MEDICINE

## 2024-08-08 PROCEDURE — 77063 BREAST TOMOSYNTHESIS BI: CPT | Performed by: FAMILY MEDICINE

## 2024-09-04 ENCOUNTER — TELEPHONE (OUTPATIENT)
Dept: PODIATRY CLINIC | Facility: CLINIC | Age: 54
End: 2024-09-04

## 2024-09-04 NOTE — TELEPHONE ENCOUNTER
Spoke with patient on telephone after surgery performed yesterday.  She is doing well and pain is under control.  She has been nonweightbearing as instructed.  All questions answered to satisfaction.

## 2024-09-05 ENCOUNTER — TELEPHONE (OUTPATIENT)
Dept: PODIATRY CLINIC | Facility: CLINIC | Age: 54
End: 2024-09-05

## 2024-09-05 NOTE — TELEPHONE ENCOUNTER
Procedure date:9/3/2024  How are you feeling? / It feels better than I thought  Any bleeding?/no    Is the dressing dry & intact?/yes   Level of pain? /0  Character of pain: /throbbing, pulling  Onset of pain:/ unsure ,that evening  Aggravating factors:/ hanging down  Duration of pain:/until she elevates and ice is great  Alleviating factors:/ as above  Are you taking the prescribed medication?/ as needed. She has been taking low dose tylenol and advil between     Medication Quantity Refills Start End   HYDROcodone-acetaminophen 5-325 MG Oral Tab 20        Medication Quantity Refills Start End   amoxicillin clavulanate 875-125 MG Oral         Are you following all of the PO instructions? / good appetite    Other Comments:She thanked me for my truthfulness in suggesting she stay home from [facility] 3 days post op. A little too early even with a scooter    Follow-up appt  date: 9/10/2024    Pt was advised if they have any concerns after hours to call our office and they would be directed to on call physician. /DONE

## 2024-09-10 ENCOUNTER — OFFICE VISIT (OUTPATIENT)
Dept: PODIATRY CLINIC | Facility: CLINIC | Age: 54
End: 2024-09-10

## 2024-09-10 DIAGNOSIS — Z47.89 ORTHOPEDIC AFTERCARE: ICD-10-CM

## 2024-09-10 DIAGNOSIS — M92.62 HAGLUND'S DEFORMITY, LEFT: ICD-10-CM

## 2024-09-10 DIAGNOSIS — M79.672 INFLAMMATORY HEEL PAIN, LEFT: ICD-10-CM

## 2024-09-10 DIAGNOSIS — M65.28 CALCIFIC ACHILLES TENDINITIS OF LEFT LOWER EXTREMITY: Primary | ICD-10-CM

## 2024-09-10 DIAGNOSIS — M77.52 RETROCALCANEAL BURSITIS, LEFT: ICD-10-CM

## 2024-09-10 PROCEDURE — 99024 POSTOP FOLLOW-UP VISIT: CPT | Performed by: STUDENT IN AN ORGANIZED HEALTH CARE EDUCATION/TRAINING PROGRAM

## 2024-09-13 NOTE — TELEPHONE ENCOUNTER
Spoke with pt regarding forms, informed her that her forms were completed & faxed to [employer] on 7/31/24.    Patient verbally understood.

## 2024-09-15 NOTE — PROGRESS NOTES
[facility] Podiatry  Progress Note    Kristen Hooks is a 54 year old female.   Chief Complaint   Patient presents with    Post-Op     1st sx on 9/3 - left foot- rates pain 1/10          HPI:     Patient is a pleasant 54-year-old female presents to clinic for postop visit status post Achilles detach and reattach with calcaneal exostectomy to [facility] (DOS: 9/3).  She has been doing well and is mostly without pain with the exception of having to put her foot down which caused a flareup.  Today she rates her pain a 1 out of 10.  She is not needing much pain medicine.  She has been taking Augmentin as prescribed for infection prophylaxis.  She is taking aspirin for infection prophylaxis.  No other complaints are mentioned.      Allergies: Flu virus vaccine   Current Outpatient Medications   Medication Sig Dispense Refill    HYDROcodone-acetaminophen 5-325 MG Oral Tab Take 1-2 tablets by mouth every 6 (six) hours as needed for Pain. 20 tablet 0    amoxicillin clavulanate 875-125 MG Oral Tab Take 1 tablet by mouth 2 (two) times daily. 20 tablet 0    sertraline 50 MG Oral Tab Take 1 tablet (50 mg total) by mouth daily. 90 tablet 3    Cholecalciferol (VITAMIN D) 50 MCG (2000 UT) Oral Cap Take by mouth.      methylPREDNISolone 4 MG Oral Tablet Therapy Pack Take per package insert (instructions). 21 tablet 0    ergocalciferol 1.25 MG (52541 UT) Oral Cap Take 1 capsule (50,000 Units total) by mouth once a week. (Patient not taking: Reported on 1/10/2023) 4 capsule 2      Past Medical History:    Anxiety    H. pylori infection    Headache disorder    Heartburn    Hemorrhoids    Stress    Wears glasses      Past Surgical History:   Procedure Laterality Date    Other surgical history  2022    colonscopy      Family History   Problem Relation Age of Onset    Heart Disorder Father     Diabetes Father     Heart Attack Father     Other (Other) Father 69        parkinsons    Cancer Mother         non-hodgkins lymphoma     Hypertension Mother     Prostate Cancer Brother       Social History     Socioeconomic History    Marital status:    Tobacco Use    Smoking status: Never    Smokeless tobacco: Never   Vaping Use    Vaping status: Never Used   Substance and Sexual Activity    Alcohol use: Yes     Alcohol/week: 3.0 standard drinks of alcohol     Types: 3 Cans of beer per week    Drug use: No    Sexual activity: Yes     Partners: Male           REVIEW OF SYSTEMS:     Denies nausea, vomiting, fever, chills.    EXAM:   There were no vitals taken for this visit.  GENERAL: well developed, well nourished, in no apparent distress  EXTREMITIES:       1. Integument: Normal skin temperature and turgor.  Incision is well coapted with sutures intact.  No dehiscence or other concerns.  2. Vascular: Dorsalis pedis two out of four bilateral and posterior tibial pulses two out of   four bilateral, capillary refill normal.  Minimal edema, consistent with postop course.   3. Musculoskeletal: Status post resection of posterior spurring calcaneus-'s deformity.  Compartments are soft compressible.  No calf pain.  Strength testing deferred.   4. Neurological: Normal sharp dull sensation; reflexes normal.        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  -X-rays obtained and reviewed.  Postoperative changes noted.  No concerns.  -Site inspected.  Noted to be healing well without concerns at this time.  -Site redressed with Betadine, dry dressing, mildly compressive wrap.  Well-padded posterior splint reapplied.  -Continue to elevate and remain nonweightbearing to left lower extremity.  -Can  take ibuprofen as needed for pain.  -Should take aspirin daily for DVT prophylaxis.  -Educated patient on acute signs of infection advised patient to seek immediate medical attention for any concerns arise.    The patient indicates understanding of these issues and agrees to the plan.    RTC 1 week.    Alexei Kang DPM    Dragon speech recognition software was used to prepare this note.  Errors in word recognition may occur.  Please contact me with any questions/concerns with this note.

## 2024-09-17 ENCOUNTER — OFFICE VISIT (OUTPATIENT)
Dept: PODIATRY CLINIC | Facility: CLINIC | Age: 54
End: 2024-09-17
Payer: COMMERCIAL

## 2024-09-17 DIAGNOSIS — Z47.89 ORTHOPEDIC AFTERCARE: ICD-10-CM

## 2024-09-17 DIAGNOSIS — M65.28 CALCIFIC ACHILLES TENDINITIS OF LEFT LOWER EXTREMITY: Primary | ICD-10-CM

## 2024-09-17 DIAGNOSIS — M77.52 RETROCALCANEAL BURSITIS, LEFT: ICD-10-CM

## 2024-09-17 DIAGNOSIS — M79.672 INFLAMMATORY HEEL PAIN, LEFT: ICD-10-CM

## 2024-09-17 PROCEDURE — L4387 NON-PNEUM WALK BOOT PRE OTS: HCPCS | Performed by: STUDENT IN AN ORGANIZED HEALTH CARE EDUCATION/TRAINING PROGRAM

## 2024-09-17 PROCEDURE — 99024 POSTOP FOLLOW-UP VISIT: CPT | Performed by: STUDENT IN AN ORGANIZED HEALTH CARE EDUCATION/TRAINING PROGRAM

## 2024-09-17 NOTE — PROGRESS NOTES
[facility] Podiatry  Progress Note    Kristen Hooks is a 54 year old female.   Chief Complaint   Patient presents with    Post-Op     Post op left foot - denies pain - No numbness- No tingling         HPI:     Patient is a pleasant 54-year-old female presents to clinic for postop visit status post Achilles detach and reattach with calcaneal exostectomy to [facility] (DOS: 9/3).  She has been doing well and is without pain. She has been NWB to her E as instructed.  She is taking aspirin for DVT prophylaxis.  No other complaints are mentioned.      Allergies: Patient has no active allergies.   Current Outpatient Medications   Medication Sig Dispense Refill    amoxicillin clavulanate 875-125 MG Oral Tab Take 1 tablet by mouth 2 (two) times daily. 20 tablet 0    ergocalciferol 1.25 MG (40045 UT) Oral Cap Take 1 capsule (50,000 Units total) by mouth once a week. 4 capsule 2    sertraline 50 MG Oral Tab Take 1 tablet (50 mg total) by mouth daily. 90 tablet 3    Cholecalciferol (VITAMIN D) 50 MCG (2000 UT) Oral Cap Take by mouth.      HYDROcodone-acetaminophen 5-325 MG Oral Tab Take 1-2 tablets by mouth every 6 (six) hours as needed for Pain. (Patient not taking: Reported on 9/17/2024) 20 tablet 0    methylPREDNISolone 4 MG Oral Tablet Therapy Pack Take per package insert (instructions). 21 tablet 0      Past Medical History:    Anxiety    H. pylori infection    Headache disorder    Heartburn    Hemorrhoids    Stress    Wears glasses      Past Surgical History:   Procedure Laterality Date    Other surgical history  2022    colonscopy      Family History   Problem Relation Age of Onset    Heart Disorder Father     Diabetes Father     Heart Attack Father     Other (Other) Father 69        parkinsons    Cancer Mother         non-hodgkins lymphoma    Hypertension Mother     Prostate Cancer Brother       Social History     Socioeconomic History    Marital status:    Tobacco Use    Smoking status: Never     Smokeless tobacco: Never   Vaping Use    Vaping status: Never Used   Substance and Sexual Activity    Alcohol use: Yes     Alcohol/week: 3.0 standard drinks of alcohol     Types: 3 Cans of beer per week    Drug use: No    Sexual activity: Yes     Partners: Male           REVIEW OF SYSTEMS:     Denies nausea, vomiting, fever, chills.    EXAM:   There were no vitals taken for this visit.  GENERAL: well developed, well nourished, in no apparent distress  EXTREMITIES:       1. Integument: Normal skin temperature and turgor.  Incision is well coapted with sutures intact.  No dehiscence or other concerns.  2. Vascular: Dorsalis pedis two out of four bilateral and posterior tibial pulses two out of   four bilateral, capillary refill normal.  Minimal edema, consistent with postop course.   3. Musculoskeletal: Status post resection of posterior spurring calcaneus-'s deformity.  Compartments are soft compressible.  No calf pain.  Strength testing deferred.   4. Neurological: Normal sharp dull sensation; reflexes normal.        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  -X-rays obtained and reviewed.  Postoperative changes noted.  No concerns.  -Site inspected.  Noted to be healing well without concerns at this time.  -Sutures removed with sterile suture we will get without incident.    -Site redressed with steri strips, Betadine, dry dressing, mildly compressive wrap.  After 2 days can remove outer dressings and get surgical site wet.  Should not soak or scrub and should gently pat site dry.  Cam boot dispensed to patient.  Okay to take off when at rest.  -Continue to elevate and remain  nonweightbearing to left lower extremity.  -Can take ibuprofen as needed for pain.  -Should take aspirin daily for DVT prophylaxis.  -Educated patient on acute signs of infection advised patient to seek immediate medical attention for any concerns arise.    The patient indicates understanding of these issues and agrees to the plan.    RTC 2 weeks.    Alexei Kang DPM    Dragon speech recognition software was used to prepare this note.  Errors in word recognition may occur.  Please contact me with any questions/concerns with this note.

## 2024-09-18 ENCOUNTER — TELEPHONE (OUTPATIENT)
Dept: PODIATRY CLINIC | Facility: CLINIC | Age: 54
End: 2024-09-18

## 2024-09-18 ENCOUNTER — LAB ENCOUNTER (OUTPATIENT)
Dept: LAB | Facility: HOSPITAL | Age: 54
End: 2024-09-18
Attending: PEDIATRICS
Payer: COMMERCIAL

## 2024-09-18 DIAGNOSIS — E78.00 PURE HYPERCHOLESTEROLEMIA: Primary | ICD-10-CM

## 2024-09-18 LAB
CHOLEST SERPL-MCNC: 171 MG/DL (ref ?–200)
FASTING PATIENT LIPID ANSWER: YES
HDLC SERPL-MCNC: 30 MG/DL (ref 40–59)
LDLC SERPL CALC-MCNC: 96 MG/DL (ref ?–100)
NONHDLC SERPL-MCNC: 141 MG/DL (ref ?–130)
TRIGL SERPL-MCNC: 267 MG/DL (ref 30–149)
VLDLC SERPL CALC-MCNC: 44 MG/DL (ref 0–30)

## 2024-09-18 PROCEDURE — 36415 COLL VENOUS BLD VENIPUNCTURE: CPT

## 2024-09-18 PROCEDURE — 80061 LIPID PANEL: CPT

## 2024-09-18 NOTE — TELEPHONE ENCOUNTER
Short term disability received via fax in forms department. Logged for providers. Valid Release of Information on file signed 7/25/24.

## 2024-09-26 NOTE — TELEPHONE ENCOUNTER
Dr. Kang    Please sign off on form if you agree to: Surgery confirmation forms.     -Signature page will be the first page scanned  -From your Inbasket, Highlight the patient and click Chart   -Double click the 09/18/24 Forms Completion telephone encounter  -Scroll down to the Media section   -Click the blue Hyperlink: Surgery confirmation Dr Kang 09/26/24  -Click Acknowledge located in the top right ribbon/menu   -Drag the mouse into the blank space of the document and a + sign will appear. Left click to   electronically sign the document.  -Once signed, simply exit out of the screen and you signature will be saved.     Thank you,    Jewels

## 2024-10-04 ENCOUNTER — OFFICE VISIT (OUTPATIENT)
Dept: PODIATRY CLINIC | Facility: CLINIC | Age: 54
End: 2024-10-04

## 2024-10-04 DIAGNOSIS — M79.672 INFLAMMATORY HEEL PAIN, LEFT: ICD-10-CM

## 2024-10-04 DIAGNOSIS — M77.52 RETROCALCANEAL BURSITIS, LEFT: ICD-10-CM

## 2024-10-04 DIAGNOSIS — M65.28 CALCIFIC ACHILLES TENDINITIS OF LEFT LOWER EXTREMITY: Primary | ICD-10-CM

## 2024-10-04 PROCEDURE — 99024 POSTOP FOLLOW-UP VISIT: CPT | Performed by: STUDENT IN AN ORGANIZED HEALTH CARE EDUCATION/TRAINING PROGRAM

## 2024-10-05 NOTE — PROGRESS NOTES
[facility]  Progress Note    Kristen Hooks is a 54 year old female.   Chief Complaint   Patient presents with    Post-Op     S/p L foot -  States some swelling. Some pain at times by her ankle.  Tingling on bottom of foot.         HPI:     Patient is a pleasant 54-year-old female presents to clinic for postop visit status post Achilles detach and reattach with calcaneal exostectomy to [facility] (DOS: 9/3).  She has been doing well and is without pain.  She did get a little bit of pain and swelling after having her foot in more dependent position recently.  She has been active with knee scooter but is stayed off of her left lower extremity.  She has been taking aspirin for DVT prophylaxis.  No other complaints are mentioned.     Allergies: Patient has no active allergies.   Current Outpatient Medications   Medication Sig Dispense Refill    HYDROcodone-acetaminophen 5-325 MG Oral Tab Take 1-2 tablets by mouth every 6 (six) hours as needed for Pain. 20 tablet 0    amoxicillin clavulanate 875-125 MG Oral Tab Take 1 tablet by mouth 2 (two) times daily. 20 tablet 0    ergocalciferol 1.25 MG (69400 UT) Oral Cap Take 1 capsule (50,000 Units total) by mouth once a week. 4 capsule 2    sertraline 50 MG Oral Tab Take 1 tablet (50 mg total) by mouth daily. 90 tablet 3    Cholecalciferol (VITAMIN D) 50 MCG (2000 UT) Oral Cap Take by mouth.        Past Medical History:    Anxiety    H. pylori infection    Headache disorder    Heartburn    Hemorrhoids    Stress    Wears glasses      Past Surgical History:   Procedure Laterality Date    Other surgical history  2022    colonscopy      Family History   Problem Relation Age of Onset    Heart Disorder Father     Diabetes Father     Heart Attack Father     Other (Other) Father 69        parkinsons    Cancer Mother         non-hodgkins lymphoma    Hypertension Mother     Prostate Cancer Brother       Social History     Socioeconomic History    Marital status:     Tobacco Use    Smoking status: Never    Smokeless tobacco: Never   Vaping Use    Vaping status: Never Used   Substance and Sexual Activity    Alcohol use: Yes     Alcohol/week: 3.0 standard drinks of alcohol     Types: 3 Cans of beer per week    Drug use: No    Sexual activity: Yes     Partners: Male           REVIEW OF SYSTEMS:     Denies nausea, vomiting, fever, chills.    EXAM:   There were no vitals taken for this visit.  GENERAL: well developed, well nourished, in no apparent distress  EXTREMITIES:       1. Integument: Normal skin temperature and turgor.  Incision is well healed.  2. Vascular: Dorsalis pedis two out of four bilateral and posterior tibial pulses two out of   four bilateral, capillary refill normal.  Minimal edema, consistent with postop course.   3. Musculoskeletal: Status post resection of prior deformity to calcaneus.  Compartments are soft compressible.  No calf pain.  Strength testing deferred.   4. Neurological: Normal sharp dull sensation; reflexes normal.        ASSESSMENT AND PLAN:   Diagnoses and all orders for this visit:    Calcific Achilles tendinitis of left lower extremity  -     Physical Therapy Referral - [facility]    Retrocalcaneal bursitis, left  -     Physical Therapy Referral - [facility]    Inflammatory heel pain, left  -     Physical Therapy Referral - [facility]          Plan:    -status post Achilles detach and reattach with calcaneal exostectomy to [facility] (DOS: 9/3).  -Prior x-rays reviewed.  Postoperative changes noted.  No concerns.  -Site inspected.  Noted to be healing well without concerns at this time.  -Okay for patient to very slowly start weightbearing in cam boot.  Should start slow and gradually increase activity as tolerated complaining pain be guide.  She returned to knee scooter pain, swelling, or other concerns arise.  -Referral placed for physical therapy.  Patient can start this in 2 weeks.  Should be conservative with the start of  physical therapy.  -Can take ibuprofen as needed for pain.  -Follow-up in 3 to 4 weeks for reevaluation or sooner if other concerns arise.  -Educated patient on acute signs of infection advised patient to seek immediate medical attention for any concerns arise.    The patient indicates understanding of these issues and agrees to the plan.    Alexei Kang DPM    Dragon speech recognition software was used to prepare this note.  Errors in word recognition may occur.  Please contact me with any questions/concerns with this note.

## 2024-10-21 ENCOUNTER — TELEPHONE (OUTPATIENT)
Dept: PODIATRY CLINIC | Facility: CLINIC | Age: 54
End: 2024-10-21

## 2024-10-21 ENCOUNTER — TELEPHONE (OUTPATIENT)
Dept: PHYSICAL THERAPY | Facility: HOSPITAL | Age: 54
End: 2024-10-21

## 2024-10-21 ENCOUNTER — PATIENT MESSAGE (OUTPATIENT)
Dept: PODIATRY CLINIC | Facility: CLINIC | Age: 54
End: 2024-10-21

## 2024-10-21 NOTE — TELEPHONE ENCOUNTER
Patient calling stating her achilles pop per patient she heard a pop sound also stating her first physical therapy  appointment is tomorrow.Please advise

## 2024-10-21 NOTE — TELEPHONE ENCOUNTER
Spoke with patient. Endorses that overnight she was woken by an audible pop in her achilles area and resultant pain that lasted a minute, then subsided. When her leg is down she feels fine, but if she moves it and uses the knee scooter she feels a burning and tugging sensation. Reports bruising and swelling on sides of ankle, swelling has gone down. Has first Physical Therapy appointment tomorrow.

## 2024-10-21 NOTE — TELEPHONE ENCOUNTER
Spoke with patient after personally speaking to Dr. Kang. He asked that we get patient in tomorrow at 12:45 pm for assessment and to let her know to postpone Physical Therapy. Called patient and she accepted appointment and verbalized understanding about canceling Physical Therapy until assessed.

## 2024-10-22 ENCOUNTER — APPOINTMENT (OUTPATIENT)
Dept: PHYSICAL THERAPY | Age: 54
End: 2024-10-22
Attending: STUDENT IN AN ORGANIZED HEALTH CARE EDUCATION/TRAINING PROGRAM
Payer: COMMERCIAL

## 2024-10-22 ENCOUNTER — OFFICE VISIT (OUTPATIENT)
Dept: PODIATRY CLINIC | Facility: CLINIC | Age: 54
End: 2024-10-22

## 2024-10-22 DIAGNOSIS — M65.28 CALCIFIC ACHILLES TENDINITIS OF LEFT LOWER EXTREMITY: ICD-10-CM

## 2024-10-22 DIAGNOSIS — M92.62 HAGLUND'S DEFORMITY, LEFT: ICD-10-CM

## 2024-10-22 DIAGNOSIS — M79.672 INFLAMMATORY HEEL PAIN, LEFT: ICD-10-CM

## 2024-10-22 DIAGNOSIS — S86.012A RUPTURE OF LEFT ACHILLES TENDON, INITIAL ENCOUNTER: Primary | ICD-10-CM

## 2024-10-22 DIAGNOSIS — M77.52 RETROCALCANEAL BURSITIS, LEFT: ICD-10-CM

## 2024-10-22 DIAGNOSIS — Z47.89 ORTHOPEDIC AFTERCARE: ICD-10-CM

## 2024-10-22 DIAGNOSIS — M76.72 PERONEAL TENDONITIS, LEFT: ICD-10-CM

## 2024-10-22 PROCEDURE — 99024 POSTOP FOLLOW-UP VISIT: CPT | Performed by: STUDENT IN AN ORGANIZED HEALTH CARE EDUCATION/TRAINING PROGRAM

## 2024-10-22 NOTE — PROGRESS NOTES
[facility]  Progress Note    Kristen Hooks is a 54 year old female.   Chief Complaint   Patient presents with    Post-Op     Left achilles tendon- 2 nights ago patient heard a pop- shooting pain up to the left for 1 minute- certain movement causes pain- patient denies pain- discomfort- feels like pulling-          HPI:     Patient is a pleasant 54-year-old female presents to clinic for postop visit status post Achilles detach and reattach with calcaneal exostectomy to [facility] (DOS: 9/3).  She has been doing well and is without pain.  Patient has started walking improved since last visit and was doing well.  However, while sleeping Sunday morning she felt a pop to her Achilles and subsequent swelling.  She does sleep without boot and does move her ankles in bed but cannot recall any sudden movement that could cause injury.  She did have a fall about a week ago but did not feel similar sensation during the fall.  She denies pain but does have some discomfort and swelling to the lateral portion of her Achilles region.  She presents today for reevaluation.      Allergies: Patient has no active allergies.   Current Outpatient Medications   Medication Sig Dispense Refill    HYDROcodone-acetaminophen 5-325 MG Oral Tab Take 1-2 tablets by mouth every 6 (six) hours as needed for Pain. (Patient not taking: Reported on 10/25/2024) 20 tablet 0    amoxicillin clavulanate 875-125 MG Oral Tab Take 1 tablet by mouth 2 (two) times daily. (Patient not taking: Reported on 10/25/2024) 20 tablet 0    ergocalciferol 1.25 MG (65051 UT) Oral Cap Take 1 capsule (50,000 Units total) by mouth once a week. 4 capsule 2    sertraline 50 MG Oral Tab Take 1 tablet (50 mg total) by mouth daily. 90 tablet 3    Cholecalciferol (VITAMIN D) 50 MCG (2000 UT) Oral Cap Take by mouth.      aspirin 325 MG Oral Tab Take 325 mg by mouth daily.        Past Medical History:    Anxiety    H. pylori infection    Headache disorder    Heartburn     Hemorrhoids    Stress    Visual impairment    Wears glasses      Past Surgical History:   Procedure Laterality Date    Eye surgery      Foot surgery Left 2024    Other surgical history  2022    colonscopy      Family History   Problem Relation Age of Onset    Heart Disorder Father     Diabetes Father     Heart Attack Father     Other (Other) Father 69        parkinsons    Cancer Mother         non-hodgkins lymphoma    Hypertension Mother     Prostate Cancer Brother       Social History     Socioeconomic History    Marital status:    Tobacco Use    Smoking status: Never    Smokeless tobacco: Never   Vaping Use    Vaping status: Never Used   Substance and Sexual Activity    Alcohol use: Yes     Alcohol/week: 3.0 standard drinks of alcohol     Types: 3 Cans of beer per week     Comment: a beer a day    Drug use: No    Sexual activity: Yes     Partners: Male           REVIEW OF SYSTEMS:     Denies nausea, vomiting, fever, chills.    EXAM:   There were no vitals taken for this visit.  GENERAL: well developed, well nourished, in no apparent distress  EXTREMITIES:       1. Integument: Normal skin temperature and turgor.  Incision is well healed.  2. Vascular: Dorsalis pedis two out of four bilateral and posterior tibial pulses two out of   four bilateral, capillary refill normal.  Minimal edema, consistent with postop course.   3. Musculoskeletal: Status post resection of prior deformity to calcaneus.  Compartments are soft compressible.  No calf pain.  Majority of Achilles tendon feels intact with there does appear to be a defect laterally/soft tissue swelling to this region.  Patient is able to dorsiflex and plantarflex foot without incident.     4. Neurological: Normal sharp dull sensation; reflexes normal.    US ANKLE LEFT COMPLETE (CPT=76881)    Result Date: 10/24/2024  CONCLUSION:  High-grade partial tear of the distal Achilles tendon.   LOCATION:  [facility]   Dictated by (CST): Cesario Paul MD on 10/24/2024  at 1:28 PM     Finalized by (CST): Cesario Paul MD on 10/24/2024 at 1:29 PM         ASSESSMENT AND PLAN:   Diagnoses and all orders for this visit:    Rupture of left Achilles tendon, initial encounter    Calcific Achilles tendinitis of left lower extremity  -     [facility] PODIATRY XR - LEFT FOOT 1 VIEW  -     US ANKLE LEFT COMPLETE (CPT=76881); Future    Retrocalcaneal bursitis, left  -     US ANKLE LEFT COMPLETE (CPT=76881); Future    Inflammatory heel pain, left  -     US ANKLE LEFT COMPLETE (CPT=76881); Future    Orthopedic aftercare  -     US ANKLE LEFT COMPLETE (CPT=76881); Future    Elliot's deformity, left  -     US ANKLE LEFT COMPLETE (CPT=76881); Future    Peroneal tendonitis, left  -     US ANKLE LEFT COMPLETE (CPT=76881); Future            Plan:    -status post Achilles detach and reattach with calcaneal exostectomy to [facility] (DOS: 9/3).  -X-rays obtained and reviewed.  Kager's triangle appears maintained and there are stable postsurgical changes to calcaneus.  -Site inspected.  Majority of tendon appears intact from what I can tell.  No obvious palpable dell.  However, there is defect laterally and send and were not concerned that a portion of the tendon may have ruptured off its insertion.  -Will order stat ultrasound to further evaluate.  -Patient should be strictly nonweightbearing to her left lower extremity and sleep in cam boot at this time while we are awaiting further testing.  -Discussed with patient we may need to repair a portion of the tendon if it did rupture or for insertion.  Hopefully this is just a strain and there is no rupture or other concerns.    Addendum:  Ultrasound does show a high-grade partial tear of lateral portion of Achilles tendon.  Difficult to tell on imaging if this is from the insertion or mid substance portion of Achilles and how extensive the tear is.    Did discuss findings with patient.  Will schedule her for repair of tendon this upcoming Tuesday.    Will also order  stat MRI to further evaluate extent of tear/help with surgical planning.    Addendum from 10/28:    MRI reviewed with Dr. Edmond, Dr. Ernandez, myself.  There does appear to be small disruption to lateral insertion of Achilles tendon.  Majority medial insertion appears intact from detach/reattach procedure.   Anchors are all intact.  Discussed with patient I still would recommend surgery to inspect site and possibly anchor lateral portion of tendon if needed.  Could also repair a small defect with Ethibond if needed.  Did discuss alternative including continued conservative management.  Patient is agreeable with surgery and inspecting site intraoperatively to make sure it is a strong as possible.      Surgery was discussed including benefits, risks, recovery period.  All questions have been answered to satisfaction.  The patient indicates understanding of these issues and agrees to the plan.    Alexei Kang DPM    Dragon speech recognition software was used to prepare this note.  Errors in word recognition may occur.  Please contact me with any questions/concerns with this note.

## 2024-10-24 ENCOUNTER — HOSPITAL ENCOUNTER (OUTPATIENT)
Dept: ULTRASOUND IMAGING | Facility: HOSPITAL | Age: 54
Discharge: HOME | End: 2024-10-24
Attending: STUDENT IN AN ORGANIZED HEALTH CARE EDUCATION/TRAINING PROGRAM
Payer: COMMERCIAL

## 2024-10-24 ENCOUNTER — TELEPHONE (OUTPATIENT)
Dept: PODIATRY CLINIC | Facility: CLINIC | Age: 54
End: 2024-10-24

## 2024-10-24 DIAGNOSIS — M76.72 PERONEAL TENDONITIS, LEFT: ICD-10-CM

## 2024-10-24 DIAGNOSIS — M77.52 RETROCALCANEAL BURSITIS, LEFT: ICD-10-CM

## 2024-10-24 DIAGNOSIS — S86.012A PARTIAL ACHILLES TENDON TEAR, LEFT, INITIAL ENCOUNTER: Primary | ICD-10-CM

## 2024-10-24 DIAGNOSIS — Z47.89 ORTHOPEDIC AFTERCARE: ICD-10-CM

## 2024-10-24 DIAGNOSIS — M65.28 CALCIFIC ACHILLES TENDINITIS OF LEFT LOWER EXTREMITY: ICD-10-CM

## 2024-10-24 DIAGNOSIS — M79.672 INFLAMMATORY HEEL PAIN, LEFT: ICD-10-CM

## 2024-10-24 DIAGNOSIS — M92.62 HAGLUND'S DEFORMITY, LEFT: ICD-10-CM

## 2024-10-24 PROCEDURE — 76881 US COMPL JOINT R-T W/IMG: CPT | Performed by: STUDENT IN AN ORGANIZED HEALTH CARE EDUCATION/TRAINING PROGRAM

## 2024-10-24 NOTE — TELEPHONE ENCOUNTER
LVM informing patient about scheduled surgery for next Tuesday at [facility]. Patient was informed about the surgery details in  and was informed that pre op and post op letters are viewable on [employer] with more information that should be noted prior to surgery date. Post op appointments have also been scheduled at this time and surgery is on the calendar. Managed care orders have been placed.

## 2024-10-24 NOTE — TELEPHONE ENCOUNTER
Procedure: Repair of partial achilles tendon tear  CPT code: 58447  Length of Surgery: 60 minutes  Any Instruments: podiatry tray, harvey sonic anchors, small power  Call patient: ASAP  Anesthesia: Gen  Location: [facility]  Assistance: none  Pacemaker: No  Anticoagulants: No  Nickel Allergy: No  Latex Allergy: No  Diagnosis/ICD Code:     ICD-10-CM    1. Partial Achilles tendon tear, left, initial encounter  S86.012A            Can  I add this for next Tuesday please? Needs to go sometime next week

## 2024-10-26 ENCOUNTER — HOSPITAL ENCOUNTER (OUTPATIENT)
Dept: MRI IMAGING | Facility: HOSPITAL | Age: 54
Discharge: HOME | End: 2024-10-26
Attending: STUDENT IN AN ORGANIZED HEALTH CARE EDUCATION/TRAINING PROGRAM
Payer: COMMERCIAL

## 2024-10-26 ENCOUNTER — TELEPHONE (OUTPATIENT)
Dept: PODIATRY CLINIC | Facility: CLINIC | Age: 54
End: 2024-10-26

## 2024-10-26 DIAGNOSIS — M65.28 CALCIFIC ACHILLES TENDINITIS OF LEFT LOWER EXTREMITY: ICD-10-CM

## 2024-10-26 DIAGNOSIS — S86.012A PARTIAL ACHILLES TENDON TEAR, LEFT, INITIAL ENCOUNTER: ICD-10-CM

## 2024-10-26 DIAGNOSIS — S86.012A PARTIAL ACHILLES TENDON TEAR, LEFT, INITIAL ENCOUNTER: Primary | ICD-10-CM

## 2024-10-26 DIAGNOSIS — M77.52 RETROCALCANEAL BURSITIS, LEFT: ICD-10-CM

## 2024-10-26 PROCEDURE — 73721 MRI JNT OF LWR EXTRE W/O DYE: CPT | Performed by: STUDENT IN AN ORGANIZED HEALTH CARE EDUCATION/TRAINING PROGRAM

## 2024-10-28 ENCOUNTER — APPOINTMENT (OUTPATIENT)
Dept: PHYSICAL THERAPY | Age: 54
End: 2024-10-28
Attending: STUDENT IN AN ORGANIZED HEALTH CARE EDUCATION/TRAINING PROGRAM
Payer: COMMERCIAL

## 2024-10-30 ENCOUNTER — APPOINTMENT (OUTPATIENT)
Dept: PHYSICAL THERAPY | Age: 54
End: 2024-10-30
Attending: STUDENT IN AN ORGANIZED HEALTH CARE EDUCATION/TRAINING PROGRAM
Payer: COMMERCIAL

## 2024-10-30 ENCOUNTER — TELEPHONE (OUTPATIENT)
Dept: PODIATRY CLINIC | Facility: CLINIC | Age: 54
End: 2024-10-30

## 2024-10-30 NOTE — TELEPHONE ENCOUNTER
Patient was called after surgery performed yesterday.  She is doing well and pain is under control.  She will follow-up on 11/5 at Dr. Ernandez in the following week with myself.  All questions answered to satisfaction.

## 2024-10-30 NOTE — TELEPHONE ENCOUNTER
Procedure date:10/29/2024    Repair of partial achilles tendon tear Left     How are you feeling?/ Good- Just spoke to Dr. Kang   Any bleeding? / no   Is the dressing dry & intact? / yes  Level of pain? /0  Character of pain: no pain  Onset of pain:/I just feel the stiches  Aggravating factors:/ nothing specific  Duration of pain:/ no pain  Alleviating factors:/ we went over elevation  Are you taking the prescribed medication?/ yes    Medication Quantity Refills Start End   cephALEXin 500 MG Oral Cap 14 capsule 0 10/29/2024 --   Sig:   Take 1 capsule (500 mg tota       Medication Quantity Refills Start End   HYDROcodone-acetaminophen 5-325 MG Oral Tab 2      Not taking the norco  Are you following all of the PO instructions? / yes     Other Comments/:we went over ice application protocol/ and positioning behind knee    Follow-up appt  date: She will call for appointment with Dr. Ernandez in 2 weeks    Pt was advised if they have any concerns after hours to call our office and they would be directed to on call physician. / Done

## 2024-11-04 ENCOUNTER — TELEPHONE (OUTPATIENT)
Dept: PHYSICAL THERAPY | Age: 54
End: 2024-11-04

## 2024-11-04 ENCOUNTER — APPOINTMENT (OUTPATIENT)
Dept: PHYSICAL THERAPY | Age: 54
End: 2024-11-04
Attending: STUDENT IN AN ORGANIZED HEALTH CARE EDUCATION/TRAINING PROGRAM

## 2024-11-05 ENCOUNTER — OFFICE VISIT (OUTPATIENT)
Dept: PODIATRY CLINIC | Facility: CLINIC | Age: 54
End: 2024-11-05

## 2024-11-05 DIAGNOSIS — M77.52 RETROCALCANEAL BURSITIS, LEFT: ICD-10-CM

## 2024-11-05 DIAGNOSIS — S86.012A PARTIAL ACHILLES TENDON TEAR, LEFT, INITIAL ENCOUNTER: ICD-10-CM

## 2024-11-05 DIAGNOSIS — M76.72 PERONEAL TENDONITIS, LEFT: ICD-10-CM

## 2024-11-05 DIAGNOSIS — M79.672 INFLAMMATORY HEEL PAIN, LEFT: ICD-10-CM

## 2024-11-05 DIAGNOSIS — Z98.890 STATUS POST FOOT SURGERY: Primary | ICD-10-CM

## 2024-11-05 DIAGNOSIS — M92.62 HAGLUND'S DEFORMITY, LEFT: ICD-10-CM

## 2024-11-05 DIAGNOSIS — M65.28 CALCIFIC ACHILLES TENDINITIS OF LEFT LOWER EXTREMITY: ICD-10-CM

## 2024-11-05 PROCEDURE — 99024 POSTOP FOLLOW-UP VISIT: CPT | Performed by: PODIATRIST

## 2024-11-06 ENCOUNTER — TELEPHONE (OUTPATIENT)
Facility: CLINIC | Age: 54
End: 2024-11-06

## 2024-11-06 ENCOUNTER — APPOINTMENT (OUTPATIENT)
Dept: PHYSICAL THERAPY | Age: 54
End: 2024-11-06
Attending: STUDENT IN AN ORGANIZED HEALTH CARE EDUCATION/TRAINING PROGRAM

## 2024-11-08 ENCOUNTER — TELEPHONE (OUTPATIENT)
Dept: PHYSICAL THERAPY | Facility: HOSPITAL | Age: 54
End: 2024-11-08

## 2024-11-10 NOTE — PROGRESS NOTES
Kristen Hooks is a 54 year old female.   Chief Complaint   Patient presents with    Post-Op     1st sx on 10/29 right foot- patient denies pain          HPI:   Patient returns to the clinic she had repair of a partial rupture of the Achilles tendon after being in a repair.  She relates no pain on the right foot some discomfort.  She has been staying off of it and not walking.  At today's visit reviewed nurse's history as taken above, allergies medications and medical history as documented below.  All changes duly noted  Allergies: Patient has no active allergies.   Current Outpatient Medications   Medication Sig Dispense Refill    cephALEXin 500 MG Oral Cap Take 1 capsule (500 mg total) by mouth 2 (two) times daily. 14 capsule 0    aspirin 325 MG Oral Tab Take 1 tablet (325 mg total) by mouth daily.      HYDROcodone-acetaminophen 5-325 MG Oral Tab Take 1-2 tablets by mouth every 6 (six) hours as needed for Pain. 20 tablet 0    ergocalciferol 1.25 MG (85556 UT) Oral Cap Take 1 capsule (50,000 Units total) by mouth once a week. 4 capsule 2    sertraline 50 MG Oral Tab Take 1 tablet (50 mg total) by mouth daily. 90 tablet 3    Cholecalciferol (VITAMIN D) 50 MCG (2000 UT) Oral Cap Take by mouth.      amoxicillin clavulanate 875-125 MG Oral Tab Take 1 tablet by mouth 2 (two) times daily. (Patient not taking: Reported on 11/5/2024) 20 tablet 0      Past Medical History:    Anxiety    H. pylori infection    Headache disorder    Heartburn    Hemorrhoids    Stress    Visual impairment    Wears glasses      Past Surgical History:   Procedure Laterality Date    Eye surgery      Foot surgery Left 2024    Other surgical history  2022    colonscopy      Family History   Problem Relation Age of Onset    Heart Disorder Father     Diabetes Father     Heart Attack Father     Other (Other) Father 69        parkinsons    Cancer Mother         non-hodgkins lymphoma    Hypertension Mother     Prostate Cancer Brother        Social History     Socioeconomic History    Marital status:    Tobacco Use    Smoking status: Never    Smokeless tobacco: Never   Vaping Use    Vaping status: Never Used   Substance and Sexual Activity    Alcohol use: Yes     Alcohol/week: 3.0 standard drinks of alcohol     Types: 3 Cans of beer per week     Comment: a beer a day    Drug use: No    Sexual activity: Yes     Partners: Male           REVIEW OF SYSTEMS:   Today reviewed systens as documented below  GENERAL HEALTH: feels well otherwise  SKIN: Refer to exam below  RESPIRATORY: denies shortness of breath with exertion  CARDIOVASCULAR: denies chest pain on exertion  GI: denies abdominal pain and denies heartburn  NEURO: denies headaches    EXAM:   There were no vitals taken for this visit.  GENERAL: well developed, well nourished, in no apparent distress  EXTREMITIES:   The dressing was clean dry intact on presentation removal shows that the incision line is healing uneventfully there is no sign of infection noted.  ASSESSMENT AND PLAN:   Diagnoses and all orders for this visit:    Status post foot surgery    Partial Achilles tendon tear, left, initial encounter    Calcific Achilles tendinitis of left lower extremity    Retrocalcaneal bursitis, left    Inflammatory heel pain, left    Peroneal tendonitis, left    Elliot's deformity, left        Plan: The Achilles tendon is intact Shukla's test is negative for rupture.  At this particular office visit the area was redressed with an aseptic dressing the patient will continue with the cam boot and stay off of the foot keep it elevated she will not walk on it.  Follow-up with Dr. Kang in 1 week    The patient indicates understanding of these issues and agrees to the plan.    Bryn Ernandez DPM

## 2024-11-11 ENCOUNTER — APPOINTMENT (OUTPATIENT)
Dept: PHYSICAL THERAPY | Age: 54
End: 2024-11-11
Attending: STUDENT IN AN ORGANIZED HEALTH CARE EDUCATION/TRAINING PROGRAM

## 2024-11-13 ENCOUNTER — APPOINTMENT (OUTPATIENT)
Dept: PHYSICAL THERAPY | Age: 54
End: 2024-11-13
Attending: STUDENT IN AN ORGANIZED HEALTH CARE EDUCATION/TRAINING PROGRAM

## 2024-11-14 ENCOUNTER — OFFICE VISIT (OUTPATIENT)
Dept: PODIATRY CLINIC | Facility: CLINIC | Age: 54
End: 2024-11-14

## 2024-11-14 DIAGNOSIS — M65.28 CALCIFIC ACHILLES TENDINITIS OF LEFT LOWER EXTREMITY: ICD-10-CM

## 2024-11-14 DIAGNOSIS — M77.52 RETROCALCANEAL BURSITIS, LEFT: ICD-10-CM

## 2024-11-14 DIAGNOSIS — Z47.89 ORTHOPEDIC AFTERCARE: ICD-10-CM

## 2024-11-14 DIAGNOSIS — S86.012A PARTIAL ACHILLES TENDON TEAR, LEFT, INITIAL ENCOUNTER: Primary | ICD-10-CM

## 2024-11-14 PROCEDURE — 99024 POSTOP FOLLOW-UP VISIT: CPT | Performed by: STUDENT IN AN ORGANIZED HEALTH CARE EDUCATION/TRAINING PROGRAM

## 2024-11-14 NOTE — PROGRESS NOTES
[facility]  Progress Note    Kristen Hooks is a 54 year old female.   Chief Complaint   Patient presents with    Post-Op     S/p R foot- Pt is healing well. No pain Here for suture removal.         HPI:     Patient is a pleasant 54-year-old female who presents to clinic for postop visit status post repair of partial Achilles tear of left lower extremity on 10/29/2024.  Patient is initially status post Achilles detach and reattach with calcaneal exostectomy on 9/3/2024.  Since revision surgery she is doing well and has been nonweightbearing.  She is without pain.  She is here for suture removal.  No other complaints are mentioned.      Allergies: Patient has no active allergies.   Current Outpatient Medications   Medication Sig Dispense Refill    cephALEXin 500 MG Oral Cap Take 1 capsule (500 mg total) by mouth 2 (two) times daily. 14 capsule 0    aspirin 325 MG Oral Tab Take 1 tablet (325 mg total) by mouth daily.      HYDROcodone-acetaminophen 5-325 MG Oral Tab Take 1-2 tablets by mouth every 6 (six) hours as needed for Pain. 20 tablet 0    amoxicillin clavulanate 875-125 MG Oral Tab Take 1 tablet by mouth 2 (two) times daily. (Patient not taking: Reported on 11/5/2024) 20 tablet 0    ergocalciferol 1.25 MG (45284 UT) Oral Cap Take 1 capsule (50,000 Units total) by mouth once a week. 4 capsule 2    sertraline 50 MG Oral Tab Take 1 tablet (50 mg total) by mouth daily. 90 tablet 3    Cholecalciferol (VITAMIN D) 50 MCG (2000 UT) Oral Cap Take by mouth.        Past Medical History:    Anxiety    H. pylori infection    Headache disorder    Heartburn    Hemorrhoids    Stress    Visual impairment    Wears glasses      Past Surgical History:   Procedure Laterality Date    Eye surgery      Foot surgery Left 2024    Other surgical history  2022    colonscopy      Family History   Problem Relation Age of Onset    Heart Disorder Father     Diabetes Father     Heart Attack Father     Other (Other)  Father 69        parkinsons    Cancer Mother         non-hodgkins lymphoma    Hypertension Mother     Prostate Cancer Brother       Social History     Socioeconomic History    Marital status:    Tobacco Use    Smoking status: Never    Smokeless tobacco: Never   Vaping Use    Vaping status: Never Used   Substance and Sexual Activity    Alcohol use: Yes     Alcohol/week: 3.0 standard drinks of alcohol     Types: 3 Cans of beer per week     Comment: a beer a day    Drug use: No    Sexual activity: Yes     Partners: Male           REVIEW OF SYSTEMS:     No nausea, other, fever, chills    EXAM:   There were no vitals taken for this visit.  GENERAL: well developed, well nourished, in no apparent distress  EXTREMITIES:   1. Integument: Normal skin temperature and turgor.  Incision well coapted sutures intact  2. Vascular: Dorsalis pedis two out of four bilateral and posterior tibial pulses two out of   four bilateral, capillary refill normal. Minimal edema consistent with post op course.   3. Musculoskeletal: Achilles tendon repair intact.  Patient able to plantarflex without incident.  Compartments are soft compressible.  No dell or other concerns   4. Neurological: Normal sharp dull sensation; reflexes normal.        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  -Site inspected.  Noted to be healing well without concerns at this time.  -Sutures removed with sterile suture removal kit without incident.  Site dressed with Steri-Strips and dry dressing.  After 2 days can remove outer dressing and get surgical site wet.  Should not soak or scrub gently pat site dry.  -Should still remain nonweightbearing to left lower extremity for  another week.  After a week can start placing partial weight with Cam boot and crutches.  If any pain or other concerns arise should resume nonweightbearing.  -Will plan to delay physical therapy another 2 weeks to ensure site is strong as possible before starting physical therapy.  -IntraOp findings did show majority of repair to be very strong and intact but was still reinforced out of precaution.  -Will follow-up in 2 weeks to see how patient is doing.  Can follow-up sooner if any other concerns arise in the meantime.      The patient indicates understanding of these issues and agrees to the plan.        Alexei Kang DPM    Dragon speech recognition software was used to prepare this note.  Errors in word recognition may occur.  Please contact me with any questions/concerns with this note.

## 2024-11-18 ENCOUNTER — APPOINTMENT (OUTPATIENT)
Dept: PHYSICAL THERAPY | Age: 54
End: 2024-11-18
Attending: STUDENT IN AN ORGANIZED HEALTH CARE EDUCATION/TRAINING PROGRAM

## 2024-11-20 ENCOUNTER — APPOINTMENT (OUTPATIENT)
Dept: PHYSICAL THERAPY | Age: 54
End: 2024-11-20
Attending: STUDENT IN AN ORGANIZED HEALTH CARE EDUCATION/TRAINING PROGRAM

## 2024-11-25 ENCOUNTER — TELEPHONE (OUTPATIENT)
Dept: PHYSICAL THERAPY | Facility: HOSPITAL | Age: 54
End: 2024-11-25

## 2024-11-25 ENCOUNTER — APPOINTMENT (OUTPATIENT)
Dept: PHYSICAL THERAPY | Age: 54
End: 2024-11-25
Attending: STUDENT IN AN ORGANIZED HEALTH CARE EDUCATION/TRAINING PROGRAM

## 2024-11-27 ENCOUNTER — APPOINTMENT (OUTPATIENT)
Dept: PHYSICAL THERAPY | Age: 54
End: 2024-11-27
Attending: STUDENT IN AN ORGANIZED HEALTH CARE EDUCATION/TRAINING PROGRAM

## 2024-11-29 ENCOUNTER — OFFICE VISIT (OUTPATIENT)
Dept: PODIATRY CLINIC | Facility: CLINIC | Age: 54
End: 2024-11-29

## 2024-11-29 DIAGNOSIS — Z47.89 ORTHOPEDIC AFTERCARE: Primary | ICD-10-CM

## 2024-11-29 DIAGNOSIS — M65.28 CALCIFIC ACHILLES TENDINITIS OF LEFT LOWER EXTREMITY: ICD-10-CM

## 2024-11-29 DIAGNOSIS — S86.012A PARTIAL ACHILLES TENDON TEAR, LEFT, INITIAL ENCOUNTER: ICD-10-CM

## 2024-11-29 DIAGNOSIS — S86.012A RUPTURE OF LEFT ACHILLES TENDON, INITIAL ENCOUNTER: ICD-10-CM

## 2024-11-29 DIAGNOSIS — Z98.890 STATUS POST FOOT SURGERY: ICD-10-CM

## 2024-11-29 PROCEDURE — 99024 POSTOP FOLLOW-UP VISIT: CPT

## 2024-11-29 NOTE — PROGRESS NOTES
[facility]  Progress Note  11/29/2024    Chief Complaint   Patient presents with    Post-Op     S/p R foot  - Pt is healing well.  Has been using  crutches at times with no issue.  No pain          HPI:     Pt is a pleasant 54 year old female who presents for third post-op visit with her . Pt had left Achilles detach/reattach with calcaneal exostectomy on 9/3/2024 by Dr. Kang and revision/repair of partial Achilles tendon tear to left lower extremity completed by Dr. Kang on 10/29/2024. Pt has been dressing wound with Betadine and a Band-Aid.  Patient is without pain at this time. She has been nonweightbearing.  She remains in a cam boot and utilizes a knee scooter. Pt appears to be doing very well. No other complaints are mentioned. Denies complaints of nausea, vomiting, fever, chills, or calf pain.      Allergies: Patient has no active allergies.   Current Outpatient Medications   Medication Sig Dispense Refill    cephALEXin 500 MG Oral Cap Take 1 capsule (500 mg total) by mouth 2 (two) times daily. 14 capsule 0    aspirin 325 MG Oral Tab Take 1 tablet (325 mg total) by mouth daily.      HYDROcodone-acetaminophen 5-325 MG Oral Tab Take 1-2 tablets by mouth every 6 (six) hours as needed for Pain. 20 tablet 0    ergocalciferol 1.25 MG (52903 UT) Oral Cap Take 1 capsule (50,000 Units total) by mouth once a week. 4 capsule 2    sertraline 50 MG Oral Tab Take 1 tablet (50 mg total) by mouth daily. 90 tablet 3    Cholecalciferol (VITAMIN D) 50 MCG (2000 UT) Oral Cap Take by mouth.      amoxicillin clavulanate 875-125 MG Oral Tab Take 1 tablet by mouth 2 (two) times daily. (Patient not taking: Reported on 11/29/2024) 20 tablet 0      Past Medical History:    Anxiety    H. pylori infection    Headache disorder    Heartburn    Hemorrhoids    Stress    Visual impairment    Wears glasses      Past Surgical History:   Procedure Laterality Date    Eye surgery      Foot surgery Left 2024    Other surgical  history  2022    colonscopy      Family History   Problem Relation Age of Onset    Heart Disorder Father     Diabetes Father     Heart Attack Father     Other (Other) Father 69        parkinsons    Cancer Mother         non-hodgkins lymphoma    Hypertension Mother     Prostate Cancer Brother       Social History     Socioeconomic History    Marital status:    Tobacco Use    Smoking status: Never    Smokeless tobacco: Never   Vaping Use    Vaping status: Never Used   Substance and Sexual Activity    Alcohol use: Yes     Alcohol/week: 3.0 standard drinks of alcohol     Types: 3 Cans of beer per week     Comment: a beer a day    Drug use: No    Sexual activity: Yes     Partners: Male           REVIEW OF SYSTEMS:     10 point ROS completed and was negative, except for pertinent positive and negatives stated in subjective.       EXAM:     GENERAL: well developed, well nourished, in no apparent distress    EXTREMITIES:  1. Integument: Normal skin temperature and turgor. Incision is well coapted. No dehiscence or other concerns.  2. Vascular: Dorsalis pedis 2/4 bilateral and posterior tibial pulses 2/4 bilateral, capillary refill normal. Minimal edema, consistent with post op course.  3. Neurological: Normal sharp dull sensation; reflexes normal  4. Musculoskeletal: Achilles tendon repair remains intact.  Patient able to plantarflex without incident.  Compartments are soft and compressible.  No dell or other concerns.     ASSESSMENT AND PLAN:   There are no diagnoses linked to this encounter.    Plan:   -Patient was seen and evaluated today in clinic.  Chart history reviewed.  -Status post  left Achilles detach/reattach with calcaneal exostectomy on 9/3/2024 and revision/repair of partial Achilles tendon tear to left lower extremity completed by Dr. Kang on 10/29/2024   -Site inspected.  Noted to be healing well without concerns at this time.  Patient can continue to apply Betadine and Band-Aid to surgical  incision  -Pt to remain in cam boot with heel lift and begin partial weightbearing with crutches. Should still try to elevate and rest is much as possible, and sleep with cam boot on.  -Patient can go ahead and start physical therapy.  Physical therapy order already placed by Dr. Kang.  -Can take ibuprofen or tylenol as needed for pain.  -Educated patient on acute signs of infection advised patient to seek immediate medical attention if any concerns arise.  -All of the patient's questions and concerns were addressed.  She indicates understanding of these issues and agrees to the plan.    Time spent reviewing pertinent information from patient's chart, reviewing any pertinent imaging, obtaining history and physical exam, discussing and mutually agreeing on a treatment plan, and documenting encounter: 30 minutes    RTC 2 weeks for or sooner if other concerns arise.    LATOSHA Alvarez        [facility]            Dragon speech recognition software was used to prepare this note.  Errors in word recognition may occur.  Please contact me with any questions/concerns with this note.

## 2024-12-02 ENCOUNTER — APPOINTMENT (OUTPATIENT)
Dept: PHYSICAL THERAPY | Age: 54
End: 2024-12-02
Attending: STUDENT IN AN ORGANIZED HEALTH CARE EDUCATION/TRAINING PROGRAM

## 2024-12-04 ENCOUNTER — APPOINTMENT (OUTPATIENT)
Dept: PHYSICAL THERAPY | Age: 54
End: 2024-12-04
Attending: STUDENT IN AN ORGANIZED HEALTH CARE EDUCATION/TRAINING PROGRAM
Payer: COMMERCIAL

## 2024-12-09 ENCOUNTER — OFFICE VISIT (OUTPATIENT)
Dept: PHYSICAL THERAPY | Age: 54
End: 2024-12-09
Attending: STUDENT IN AN ORGANIZED HEALTH CARE EDUCATION/TRAINING PROGRAM
Payer: COMMERCIAL

## 2024-12-09 DIAGNOSIS — M65.28 CALCIFIC ACHILLES TENDINITIS OF LEFT LOWER EXTREMITY: Primary | ICD-10-CM

## 2024-12-09 DIAGNOSIS — M77.52 RETROCALCANEAL BURSITIS, LEFT: ICD-10-CM

## 2024-12-09 DIAGNOSIS — M79.672 INFLAMMATORY HEEL PAIN, LEFT: ICD-10-CM

## 2024-12-09 PROCEDURE — 97161 PT EVAL LOW COMPLEX 20 MIN: CPT

## 2024-12-09 PROCEDURE — 97110 THERAPEUTIC EXERCISES: CPT

## 2024-12-09 NOTE — PROGRESS NOTES
LOWER EXTREMITY EVALUATION:     Diagnosis:    Calcific Achilles tendinitis of left lower extremity (M65.28)  Retrocalcaneal bursitis, left (M77.52)  Inflammatory heel pain, left (M79.672)         Referring Provider: Alexei Kang  Date of Evaluation:    12/9/2024    Precautions:    Slow progression of out of boot exercises done sitting or supine. ROM and light PRE's exercises sitting or on table, can use bike. Boot on with FWB   Next MD visit:   none scheduled  Date of Surgery: n/a     PATIENT SUMMARY   Kristen Hooks is a 54 year old female who presents to therapy today s/p two recent surgeries for Achilles tendon. Initial surgery was Achilles detachment and reattachment for calcaneal exostectomy on 9/3/2024. 2nd surgery was to repair partial Achilles rupture on 10 /29/2024. Pt is currently ambulating in CAM boot with 1/4\" heel lift, B axillary crutches, WBAT. Pt reports no pain currently. Prior to surgeries pt had chronic achilles pain that limited mobility. 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   Will    Pt describes pain level current 1/10, at best 0/10, at worst 3-4/10.   Current functional limitations include inability to ambulate independently on level surfaces and stairs. Difficulty with transfers, squatting activities. Unable to work.     Kristen describes prior level of function chronic heel pain limited mobility. Independent with all mobility prior to surgery.   Pt goals include restore independent mobility w/o restrictions.   Past medical history was reviewed with Kristen.     ASSESSMENT  Kristen is s/p two recent surgeries for Achilles tendon. Initial surgery was Achilles detachment and reattachment for calcaneal exostectomy on 9/3/2024. 2nd surgery was to repair partial Achilles rupture on 10 /29/2024. Pt is currently ambulating in CAM boot with 1/4\" heel lift, B axillary crutches, WBAT. Pt reports no pain currently. Prior to surgeries pt had chronic achilles pain that limited  mobility.    The results of the objective tests and measures show global loss of ankle foot ROM and strength. Mild ankle edema present. There is decreased hip and knee strength. Limited exam due to post surgical precautions. Pt with no Achilles pain with exam. Incision healing.     Functional deficits include but are not limited to inability to ambulate independently on level surfaces and stairs. Difficulty with transfers, squatting activities. Unable to work.     Signs and symptoms are consistent with diagnosis of mobility dysfunction due to Achilles repair. Pt and PT discussed evaluation findings, pathology, POC and HEP.  Pt voiced understanding and performs HEP correctly without reported pain. Skilled Physical Therapy is medically necessary to address the above impairments and reach functional goals.     OBJECTIVE:   Observation: ambulates with CAM boot, axillary crutches. 1/4 inch heel lift in boot. Healing incision, no drainage or signs of infection.   Palpation: negative at foot and ankle  Ankle girth: figure 8 R 57.0 cm    AROM:   Hip Knee Foot/Ankle   L hip all motions WNL WNL DF:   L 0  PF: R L 40  INV: R  L 20  EV: R L 20  Great toe ext:  L 55       Flexibility:    Gastroc-soleus:  L decreased    Strength/MMT:  Hip Knee Foot/Ankle   L hip 3+/5 to 4/5 with abduction, ER and EXT weakest 4/5    Globally all < 3/5 at ankle  Great toe is 4/5       Today’s Treatment and Response:   Pt education was provided on exam findings, treatment diagnosis, treatment plan, expectations, and prognosis.     Patient was instructed in, performed and issued a HEP for L hip SLR flexion with TKE maintained x 10 reps in supine. L hip IR with reverse clam x 10 and ER with clam x 10, L hip ABD x 5 reps 2 sets, all in R side lying. HO issued for HEP.     Charges: PT Eval Low Complexity, TE 1      Total Timed Treatment: 12 min     Total Treatment Time: 45 min     PLAN OF CARE:    Goals: (to be met in 18 visits)  Pt will improve ankle  DF ROM to 10 deg to allow proper heel strike during gait and terminal knee extension in stance so pt can ambulate independently in the community safely.   Pt will improve flexion AROM to WFL as noted with performing full single heel raise, improve ability to ambulate stairs reciprocally, 1 rail assist, 1 flight, with little to no difficulty.   Pt will improve hip and quad strength to 5/5, ankle DF ROM with knee flexed to > 20* so she can perform squatting activities with little to no difficulty.  Pt will improve SLS to >30s to improve safety and independence with gait on uneven surfaces such as grass, able to make quick direction changes with little to no difficulty.   Pt will be independent and compliant with comprehensive HEP to maintain progress achieved in PT    Frequency / Duration: Patient will be seen for 1-2 x/week or a total of 18 visits over a 90 day period. Treatment will include: Manual Therapy, Neuromuscular Re-education, Therapeutic Exercise, and Home Exercise Program instruction    Education or treatment limitation: None  Rehab Potential:good    LEFS Score  No data recorded    Patient/Family/Caregiver was advised of these findings, precautions, and treatment options and has agreed to actively participate in planning and for this course of care.    Thank you for your referral.  If you have any questions, please contact me at Dept: [phone]    Sincerely,  Electronically signed by therapist: Leonardo Yates, PT

## 2024-12-11 ENCOUNTER — OFFICE VISIT (OUTPATIENT)
Dept: PHYSICAL THERAPY | Age: 54
End: 2024-12-11
Attending: STUDENT IN AN ORGANIZED HEALTH CARE EDUCATION/TRAINING PROGRAM
Payer: COMMERCIAL

## 2024-12-11 PROCEDURE — 97110 THERAPEUTIC EXERCISES: CPT

## 2024-12-11 NOTE — PROGRESS NOTES
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 ROM and light PRE's exercises sitting or on table, can use bike. Boot on with FWB   Next MD visit:   none scheduled  Date of Surgery: n/a     Insurance Primary/Secondary: [employer] / N/A       Total Timed Treatment: 40 min Total Treatment time: 40 min  Charges: TE 3  Treatment Number: 2    Subjective: pt reports no issues with achilles following initial assessment and performing her PT HEP. Good compliancy with boot. No achilles pain.     Pain: 0/10     Objective:  Sit:  L foot scrunches for foot digit flexion AROM x 30 sec bouts 4 bouts add HEP    L ankle AROM with alphabet with capital letters. Completed A-P then 30 sec rest. Complete P-Z  add HEP    L knee extension with green band x 20 reps add HEP    L knee flexion with green band x 20 reps add HEP    Supine:  L hip SLR flexion with TKE maintained x 10 progressed to 15 reps HEP    On R side:  L hip IR with reverse clam x 10 progressed to 15 HEP  L hip ER with clam x 10 progressed to 15 HEP  L hip ABD SLR x 5 reps 3 sets HEP    Assessment:   Post surgical precautions established with Dr. Kang. See precautions for details. Pt tolerating all tx well with no achilles symptoms. Program and HEP was progressed for foot intrinsic strength,  knee flex and ext strength with resistance band. Initial HEP reviewed and progressed in reps. Good compliancy by pt. Tx progression will be slow but steady and expect to be tolerated well. HO and band issued for additional HEP. Pt increasing WB by decreasing knee scooter use and using crutches mainly for mobility.     Ankle DF +2* AROM, goal 1.        Goals: (to be met in 18 visits)  Pt will improve ankle DF ROM to 10 deg  to allow proper heel strike during gait and terminal knee extension in stance so pt can ambulate independently in the community safely.   Pt will improve ankle plantarflexion AROM to WFL as noted with performing full single heel raise, improve ability to ambulate stairs reciprocally, 1 rail assist, 1 flight, with little to no difficulty.   Pt will improve hip and quad strength to 5/5, ankle DF ROM with knee flexed to > 20* so she can perform squatting activities with little to no difficulty.  Pt will improve SLS to >30s to improve safety and independence with gait on uneven surfaces such as grass, able to make quick direction changes with little to no difficulty.   Pt will be independent and compliant with comprehensive HEP to maintain progress achieved in PT    LEFS Score  No data recorded    Plan: check latest HEP. Pt to bring in gym shoe to be used with bike. Progress gait with single crutch use.

## 2024-12-13 ENCOUNTER — OFFICE VISIT (OUTPATIENT)
Dept: PODIATRY CLINIC | Facility: CLINIC | Age: 54
End: 2024-12-13

## 2024-12-13 DIAGNOSIS — Z47.89 ORTHOPEDIC AFTERCARE: Primary | ICD-10-CM

## 2024-12-13 DIAGNOSIS — M65.28 CALCIFIC ACHILLES TENDINITIS OF LEFT LOWER EXTREMITY: ICD-10-CM

## 2024-12-13 DIAGNOSIS — S86.012A PARTIAL ACHILLES TENDON TEAR, LEFT, INITIAL ENCOUNTER: ICD-10-CM

## 2024-12-13 DIAGNOSIS — Z98.890 STATUS POST FOOT SURGERY: ICD-10-CM

## 2024-12-13 PROCEDURE — 99024 POSTOP FOLLOW-UP VISIT: CPT | Performed by: STUDENT IN AN ORGANIZED HEALTH CARE EDUCATION/TRAINING PROGRAM

## 2024-12-15 NOTE — PROGRESS NOTES
[facility]  Progress Note  11/29/2024    Chief Complaint   Patient presents with    Post-Op     Left foot - denies pain- No numbness - No tingling         HPI:     Pt is a pleasant 54 year old female who presents for post-op visit s/p left Achilles detach/reattach with calcaneal exostectomy on 9/3/2024 with revision/repair of partial Achilles tendon tear to left lower extremity on 10/29/2024.  Patient relates that she is doing well and is without pain at this time.  She has been ambulating cam boot with assistance of crutches at times.  She has started physical therapy which is going well.  She is without pain, numbness, or tingling.  Her strength is improving nicely.  She has very minor eschar along incision which is nearly resolved.  She has been applying Betadine to the site.  No other complaints are mentioned.       Allergies: Patient has no active allergies.   Current Outpatient Medications   Medication Sig Dispense Refill    cephALEXin 500 MG Oral Cap Take 1 capsule (500 mg total) by mouth 2 (two) times daily. 14 capsule 0    aspirin 325 MG Oral Tab Take 1 tablet (325 mg total) by mouth daily.      HYDROcodone-acetaminophen 5-325 MG Oral Tab Take 1-2 tablets by mouth every 6 (six) hours as needed for Pain. 20 tablet 0    amoxicillin clavulanate 875-125 MG Oral Tab Take 1 tablet by mouth 2 (two) times daily. 20 tablet 0    ergocalciferol 1.25 MG (32603 UT) Oral Cap Take 1 capsule (50,000 Units total) by mouth once a week. 4 capsule 2    sertraline 50 MG Oral Tab Take 1 tablet (50 mg total) by mouth daily. 90 tablet 3    Cholecalciferol (VITAMIN D) 50 MCG (2000 UT) Oral Cap Take by mouth.        Past Medical History:    Anxiety    H. pylori infection    Headache disorder    Heartburn    Hemorrhoids    Stress    Visual impairment    Wears glasses      Past Surgical History:   Procedure Laterality Date    Eye surgery      Foot surgery Left 2024    Other surgical history  2022    colonscopy       Family History   Problem Relation Age of Onset    Heart Disorder Father     Diabetes Father     Heart Attack Father     Other (Other) Father 69        parkinsons    Cancer Mother         non-hodgkins lymphoma    Hypertension Mother     Prostate Cancer Brother       Social History     Socioeconomic History    Marital status:    Tobacco Use    Smoking status: Never    Smokeless tobacco: Never   Vaping Use    Vaping status: Never Used   Substance and Sexual Activity    Alcohol use: Yes     Alcohol/week: 3.0 standard drinks of alcohol     Types: 3 Cans of beer per week     Comment: a beer a day    Drug use: No    Sexual activity: Yes     Partners: Male           REVIEW OF SYSTEMS:     10 point ROS completed and was negative, except for pertinent positive and negatives stated in subjective.       EXAM:     GENERAL: well developed, well nourished, in no apparent distress    EXTREMITIES:  1. Integument: Normal skin temperature and turgor. Incision is well healed with very minor eschar.  Nearly resolved.  2. Vascular: Dorsalis pedis 2/4 bilateral and posterior tibial pulses 2/4 bilateral, capillary refill normal.  No major edema today.  3. Neurological: Normal sharp dull sensation; reflexes normal  4. Musculoskeletal: Achilles tendon repair remains intact.  Patient able to plantarflex without incident.  Compartments are soft and compressible.  No dell or other concerns.     ASSESSMENT AND PLAN:   Diagnoses and all orders for this visit:    Orthopedic aftercare    Status post foot surgery    Calcific Achilles tendinitis of left lower extremity    Partial Achilles tendon tear, left, initial encounter        Plan:   -Patient was seen and evaluated today in clinic.  Chart history reviewed.  -Status post  left Achilles detach/reattach with calcaneal exostectomy on 9/3/2024 and revision/repair of partial Achilles tendon tear to left lower extremity on 10/29/2024   -Site inspected.  Noted to be healing well without  concerns at this time.    -Patient to continue progressing in her ambulating with Cam boot and heel lift.  Can slowly removed left as tolerated.  Continue physical therapy as prescribed.  She should listen to her body as she increases activity.  -Can take ibuprofen as needed if any pain arises.  -Educated patient on acute signs of infection advised patient to seek immediate medical attention if any concerns arise.  -All of the patient's questions and concerns were addressed.  She indicates understanding of these issues and agrees to the plan.    Will plan to follow-up with patient in 2 weeks.  If she continues to do well can work on transitioning patient out of cam boot.  Patient will notify me if any concerns arise in the meantime.  Appreciate the opportunity participate in patient's care.    Alexei Kang DPM    Dragon speech recognition software was used to prepare this note.  Errors in word recognition may occur.  Please contact me with any questions/concerns with this note.

## 2024-12-16 ENCOUNTER — OFFICE VISIT (OUTPATIENT)
Dept: PHYSICAL THERAPY | Age: 54
End: 2024-12-16
Attending: STUDENT IN AN ORGANIZED HEALTH CARE EDUCATION/TRAINING PROGRAM
Payer: COMMERCIAL

## 2024-12-16 PROCEDURE — 97140 MANUAL THERAPY 1/> REGIONS: CPT

## 2024-12-16 PROCEDURE — 97110 THERAPEUTIC EXERCISES: CPT

## 2024-12-16 NOTE — PROGRESS NOTES
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 ROM and light PRE's exercises sitting or on table, can use bike. Boot on with FWB   Next MD visit:   none scheduled  Date of Surgery: n/a     Insurance Primary/Secondary: [employer] / N/A       Total Timed Treatment: 55 min Total Treatment time: 55 min  Charges: TE 3 man 1  Treatment Number: 2    Subjective: Had f/u with Dr. Kang. Progressing as expected. Will be able to use pool when in [place] next week. Can decrease from B crutch to single crutch R UE. Continues with good HEP. Pt performing more house chores such as laundry, cooking      Pain: 0/10     Objective:    Ambulate with R UE single crutch in clinic with boot on:    Man PT: 12 min  Vicky TC joint grade 3,4 mobes in supine for DF  Ankle TC joint grade 3,4 mobes in prone for PF    Supine:  Ankle PROM DF to end range x 2 min  Ankle PROM PF x 1 min  Light manual resistance with ecc and conc motions of ankle inver, ever DF and PF x 1 min each  total 4 min    Sit:  L foot scrunches for foot digit flexion AROM x 30 sec bouts 4 bouts  HEP    L ankle AROM with alphabet with capital letters. Completed A-P then 30 sec rest. Complete P-Z   HEP    L knee extension with green band x 20 reps  HEP    L knee flexion with green band x 20 reps  HEP    Supine:  L hip SLR flexion with TKE maintained x 10 progressed to 15 reps HEP    On R side:  L hip IR with reverse clam x 10 progressed to 15 HEP  L hip ER with clam x 10 progressed to 15 HEP  L hip ABD SLR x 10 HEP    Put on gym shoe, NWB to bike, upright bike seat 3 level 1 progressed to level 2 resist, 10 min total    Assessment:   Pt has progressed to single UE crutch with ambulation, goal 1. Initiated ankle  mobilizations for hypo mobile TC joint with good results. DF improved to 10* and PF 50*, goals 1, 2. No achilles symptoms throughout tx. Independent with current HEP.         Goals: (to be met in 18 visits)  Pt will improve ankle DF ROM to 10 deg to allow proper heel strike during gait and terminal knee extension in stance so pt can ambulate independently in the community safely.   Pt will improve ankle plantarflexion AROM to WFL as noted with performing full single heel raise, improve ability to ambulate stairs reciprocally, 1 rail assist, 1 flight, with little to no difficulty.   Pt will improve hip and quad strength to 5/5, ankle DF ROM with knee flexed to > 20* so she can perform squatting activities with little to no difficulty.  Pt will improve SLS to >30s to improve safety and independence with gait on uneven surfaces such as grass, able to make quick direction changes with little to no difficulty.   Pt will be independent and compliant with comprehensive HEP to maintain progress achieved in PT    LEFS Score  No data recorded    Plan: can remove 1/4 inch heel lift in boot if tolerated well. Initiate light resistive band ankle exercises. Can have a trial of no crutches with boot on on clinic.

## 2024-12-18 ENCOUNTER — OFFICE VISIT (OUTPATIENT)
Dept: PHYSICAL THERAPY | Age: 54
End: 2024-12-18
Attending: STUDENT IN AN ORGANIZED HEALTH CARE EDUCATION/TRAINING PROGRAM
Payer: COMMERCIAL

## 2024-12-18 PROCEDURE — 97110 THERAPEUTIC EXERCISES: CPT

## 2024-12-18 NOTE — PROGRESS NOTES
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 ROM and light PRE's exercises sitting or on table, can use bike. Boot on with FWB   Next MD visit:   none scheduled  Date of Surgery: n/a     Insurance Primary/Secondary: [employer] / N/A       Total Timed Treatment: 40 min Total Treatment time: 40 min  Charges: TE 3   Treatment Number: 3    Subjective: Pt with some foot soft tissue irritability and fatigue following last tx, no achilles issues.     Pain: 0/10     Objective:    Ambulate with R UE single crutch in clinic with boot on:    Sit:  L knee extension with 5 lb ankle wt, 3 sec hold x 10 reps 3 sets    Supine:  Ankle PROM DF to end range x 2 min  Ankle PROM PF x 1 min  Light manual resistance with ecc and conc motions of ankle inver, ever DF and PF x 1 min each  total 4 min    Long sit:  L ankle AROM with red band:  DF x 20 reps  PF x 30 reps  Inver x 20 reps  Ever x 20 reps     Remove 1/4 heel lift from boot. Have pt amara boot. Ambulate in clinic with boot, no crutch x 1-2 minutes. Again with crutch in R UE x 2 minutes       HEP: as noted above and below:    Sit:  L foot scrunches for foot digit flexion AROM x 30 sec bouts 4 bouts  HEP    L ankle AROM with alphabet with capital letters. Completed A-P then 30 sec rest. Complete P-Z   HEP    L knee extension with green band x 20 reps  HEP    L knee flexion with green band x 20 reps  HEP    Supine:  L hip SLR flexion with TKE maintained x 10 progressed to 15 reps HEP    On R side:  L hip IR with reverse clam x 10 progressed to 15 HEP  L hip ER with clam x 10 progressed to 15 HEP  L hip ABD SLR x 10 HEP    Assessment:   Pt with 5* ankle DF before tx. Loss of 5* from prior session due AM boot  restrictions. Restored to 10* with tx, goal 1. HEP was progressed with light ankle PRE's as noted above, goals 1,2. All tolerated well, no achilles symptoms, all with mild muscle fatigue. HO and band issued for HEP.  Removed heel lift from CAM boot. No achilles issues when ambulating with boot with 1 crutch or w/o crutch. Pt understands if achilles irritability is noted to return to using heel lift.         Goals: (to be met in 18 visits)  Pt will improve ankle DF ROM to 10 deg to allow proper heel strike during gait and terminal knee extension in stance so pt can ambulate independently in the community safely.   Pt will improve ankle plantarflexion AROM to WFL as noted with performing full single heel raise, improve ability to ambulate stairs reciprocally, 1 rail assist, 1 flight, with little to no difficulty.   Pt will improve hip and quad strength to 5/5, ankle DF ROM with knee flexed to > 20* so she can perform squatting activities with little to no difficulty.  Pt will improve SLS to >30s to improve safety and independence with gait on uneven surfaces such as grass, able to make quick direction changes with little to no difficulty.   Pt will be independent and compliant with comprehensive HEP to maintain progress achieved in PT    LEFS Score  No data recorded    Plan: [place]. Has been cleared by surgeon to use pool for exercise. CAM boot when out of pool WB.

## 2024-12-30 ENCOUNTER — TELEPHONE (OUTPATIENT)
Facility: CLINIC | Age: 54
End: 2024-12-30

## 2025-01-06 ENCOUNTER — OFFICE VISIT (OUTPATIENT)
Dept: PODIATRY CLINIC | Facility: CLINIC | Age: 55
End: 2025-01-06

## 2025-01-06 DIAGNOSIS — S86.012A PARTIAL ACHILLES TENDON TEAR, LEFT, INITIAL ENCOUNTER: ICD-10-CM

## 2025-01-06 DIAGNOSIS — Z98.890 STATUS POST FOOT SURGERY: ICD-10-CM

## 2025-01-06 DIAGNOSIS — Z47.89 ORTHOPEDIC AFTERCARE: Primary | ICD-10-CM

## 2025-01-06 DIAGNOSIS — M65.28 CALCIFIC ACHILLES TENDINITIS OF LEFT LOWER EXTREMITY: ICD-10-CM

## 2025-01-08 ENCOUNTER — OFFICE VISIT (OUTPATIENT)
Dept: PHYSICAL THERAPY | Age: 55
End: 2025-01-08
Attending: STUDENT IN AN ORGANIZED HEALTH CARE EDUCATION/TRAINING PROGRAM
Payer: COMMERCIAL

## 2025-01-08 PROCEDURE — 97110 THERAPEUTIC EXERCISES: CPT

## 2025-01-08 NOTE — PROGRESS NOTES
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 ROM and light PRE's exercises sitting or on table, can use bike. Boot on with FWB   Next MD visit:   none scheduled  Date of Surgery: n/a     Insurance Primary/Secondary: [employer] / N/A       Total Timed Treatment: 40 min Total Treatment time: 40 min  Charges: TE 3   Treatment Number: 5    Subjective:     Pt has been in [place], using pool. Boot on when out of pool with WB activities. Had f/u with surgeon. Can wean off boot beginning Jan 13th. Pt brought shoe to PT, can begin light exercise in PT with shoe on. Pt will be out of town the next 2 weeks traveling.   Pain: 0/10     Objective:    Remove CAM boot and place on gym shoe    Moderate gait symmetry L  SPC in R hand, instruct on reciprocal gait, ambulate in clinic x 5 min total in clinic throughout tx    Upright bike seat 3 level 1 resist x 10 min    Shuttle:  B leg press 5 of 8 bands x 20 reps  Single leg 3 of 8 bands x 10 reps 3 sets    Single leg balance, several trials each best time L < 5 seconds  R 14 seconds    Stand:  R hip hike, hold 10 seconds x 6 reps add HEP.       HEP: as noted above and below:      Assessment:   Pt can now begin using shoe for light exercise in PT. Initiated tx with gym shoe including ambulating using SPC to improve gait mechanics.  Pt to use single crutch with ambulating with shoe on at home and then further progressing wear time in and out of home which will be the focus of her HEP until seen again in 2 weeks. The expectation will she will mostly out of boot for safe environments regularly. HEP was issued for hip hiking to address hip weakness causing gait asymmetry and hip drop during single leg loading such as with  balance and gait. Exercise will also assist with pt adapting to returning to FWB with shoe on. HO was issued.         Goals: (to be met in 18 visits)  Pt will improve ankle DF ROM to 10 deg to allow proper heel strike during gait and terminal knee extension in stance so pt can ambulate independently in the community safely.   Pt will improve ankle plantarflexion AROM to WFL as noted with performing full single heel raise, improve ability to ambulate stairs reciprocally, 1 rail assist, 1 flight, with little to no difficulty.   Pt will improve hip and quad strength to 5/5, ankle DF ROM with knee flexed to > 20* so she can perform squatting activities with little to no difficulty.  Pt will improve SLS to >30s to improve safety and independence with gait on uneven surfaces such as grass, able to make quick direction changes with little to no difficulty.   Pt will be independent and compliant with comprehensive HEP to maintain progress achieved in PT    LEFS Score  No data recorded    Plan: Pt to return in 2 weeks. Progress accordingly.

## 2025-01-11 NOTE — PROGRESS NOTES
[facility]  Progress Note  11/29/2024    Chief Complaint   Patient presents with    Post-Op         HPI:     Pt is a pleasant 54 year old female who presents for post-op visit s/p left Achilles detach/reattach with calcaneal exostectomy on 9/3/2024 with revision/repair of partial Achilles tendon tear to left lower extremity on 10/29/2024.  Patient relates that she is doing well and is without pain at this time.  She has been ambulating cam boot without assistance of crutches at times.  Patient has not completed physical therapy in several weeks as she was in [place] for family reasons.  She was able to do a lot of pool therapy on her own during this time.  She relates that she is doing well and is without pain and the strength in her Achilles tendon is increasing nicely.  She does get some pain after being on it for long periods of time but overall is doing nicely.  She is scheduled to start physical therapy again tomorrow.  No other complaints are mentioned.    Allergies: Patient has no active allergies.   Current Outpatient Medications   Medication Sig Dispense Refill    cephALEXin 500 MG Oral Cap Take 1 capsule (500 mg total) by mouth 2 (two) times daily. 14 capsule 0    aspirin 325 MG Oral Tab Take 1 tablet (325 mg total) by mouth daily.      HYDROcodone-acetaminophen 5-325 MG Oral Tab Take 1-2 tablets by mouth every 6 (six) hours as needed for Pain. 20 tablet 0    amoxicillin clavulanate 875-125 MG Oral Tab Take 1 tablet by mouth 2 (two) times daily. 20 tablet 0    ergocalciferol 1.25 MG (05354 UT) Oral Cap Take 1 capsule (50,000 Units total) by mouth once a week. 4 capsule 2    sertraline 50 MG Oral Tab Take 1 tablet (50 mg total) by mouth daily. 90 tablet 3    Cholecalciferol (VITAMIN D) 50 MCG (2000 UT) Oral Cap Take by mouth.        Past Medical History:    Anxiety    H. pylori infection    Headache disorder    Heartburn    Hemorrhoids    Stress    Visual impairment    Wears glasses      Past  Surgical History:   Procedure Laterality Date    Eye surgery      Foot surgery Left 2024    Other surgical history  2022    colonscopy      Family History   Problem Relation Age of Onset    Heart Disorder Father     Diabetes Father     Heart Attack Father     Other (Other) Father 69        parkinsons    Cancer Mother         non-hodgkins lymphoma    Hypertension Mother     Prostate Cancer Brother       Social History     Socioeconomic History    Marital status:    Tobacco Use    Smoking status: Never    Smokeless tobacco: Never   Vaping Use    Vaping status: Never Used   Substance and Sexual Activity    Alcohol use: Yes     Alcohol/week: 3.0 standard drinks of alcohol     Types: 3 Cans of beer per week     Comment: a beer a day    Drug use: No    Sexual activity: Yes     Partners: Male           REVIEW OF SYSTEMS:     10 point ROS completed and was negative, except for pertinent positive and negatives stated in subjective.       EXAM:     GENERAL: well developed, well nourished, in no apparent distress        EXTREMITIES:  1. Integument: Normal skin temperature and turgor. Incision is well healed.  2. Vascular: Dorsalis pedis 2/4 bilateral and posterior tibial pulses 2/4 bilateral, capillary refill normal.  No major edema today.  3. Neurological: Normal sharp dull sensation; reflexes normal  4. Musculoskeletal: Achilles tendon repair remains intact.  Patient able to plantarflex without incident.  Compartments are soft and compressible.  No dell or other concerns. No strength deficits or other concerns.     ASSESSMENT AND PLAN:   Diagnoses and all orders for this visit:    Orthopedic aftercare    Status post foot surgery    Calcific Achilles tendinitis of left lower extremity    Partial Achilles tendon tear, left, initial encounter        Plan:   -Patient was seen and evaluated today in clinic.  Chart history reviewed.  -Status post  left Achilles detach/reattach with calcaneal exostectomy on 9/3/2024 and  revision/repair of partial Achilles tendon tear to left lower extremity on 10/29/2024   -Site inspected.  Noted to be healing well without concerns at this time.    -Patient to continue progressing in her CAM boot.  Recommend patient stay in cam boot for another 1 to 2 weeks prior to transitioning to supportive tennis shoes.   -She should continue physical therapy as prescribed.   -Given patient's high demand job, we will keep her off work until 2/1/2025 so she has time to fully resume activity and supportive tennis shoes and does not rest with return to work.   -Can take ibuprofen as needed if any pain arises.  -Educated patient on acute signs of infection advised patient to seek immediate medical attention if any concerns arise.  -All of the patient's questions and concerns were addressed.  She indicates understanding of these issues and agrees to the plan.    RTC 2 to 3 weeks.  Should continue PT in the meantime.  Should follow-up if any other concerns arise.    Alexei Kang DPM    Dragon speech recognition software was used to prepare this note.  Errors in word recognition may occur.  Please contact me with any questions/concerns with this note.

## 2025-01-13 ENCOUNTER — APPOINTMENT (OUTPATIENT)
Dept: PHYSICAL THERAPY | Age: 55
End: 2025-01-13
Attending: STUDENT IN AN ORGANIZED HEALTH CARE EDUCATION/TRAINING PROGRAM
Payer: COMMERCIAL

## 2025-01-15 ENCOUNTER — APPOINTMENT (OUTPATIENT)
Dept: PHYSICAL THERAPY | Age: 55
End: 2025-01-15
Attending: FAMILY MEDICINE
Payer: COMMERCIAL

## 2025-01-20 ENCOUNTER — APPOINTMENT (OUTPATIENT)
Dept: PHYSICAL THERAPY | Age: 55
End: 2025-01-20
Attending: FAMILY MEDICINE
Payer: COMMERCIAL

## 2025-01-22 ENCOUNTER — OFFICE VISIT (OUTPATIENT)
Dept: PHYSICAL THERAPY | Age: 55
End: 2025-01-22
Attending: STUDENT IN AN ORGANIZED HEALTH CARE EDUCATION/TRAINING PROGRAM
Payer: COMMERCIAL

## 2025-01-22 PROCEDURE — 97112 NEUROMUSCULAR REEDUCATION: CPT

## 2025-01-22 PROCEDURE — 97110 THERAPEUTIC EXERCISES: CPT

## 2025-01-22 NOTE — PROGRESS NOTES
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 ROM and light PRE's exercises sitting or on table, can use bike. Boot on with FWB   Next MD visit:   none scheduled  Date of Surgery: n/a     Insurance Primary/Secondary: [employer] / N/A       Total Timed Treatment: 40 min Total Treatment time: 40 min  Charges: TE 2 NMR 1   Treatment Number: 6    Subjective:     Pt has been steadily increasing her tolerance for standing and walking. Gait is independent with regular shoe wear. Remains with ankle and hip fatigue with walking, decreased balance, general leg fatigue. Has concerns about returning to work and tolerating job duties.   Pain: 0/10     Objective:      TE: 25 min  Moderate gait symmetry L with Trendelenburg type pattern.     Upright bike seat 3 level 1-3 resist x 10 min    Shuttle:  B leg press 5 of 8 bands x 20 reps  Single leg 3 progressed to 4 of 8 bands x 10 reps 3 sets    B heel raise cues to emphasize L x 15 reps add HEP    NMR: 15 min  Single leg balance, several trials each best time L 7 seconds     Balance board:  B LE ankle DF/PF, x 2 min progressed from min UE assist to no assist for short periods  B LE ankle inver/ever, x 2 min progressed from min UE assist to no assist for short periods  L LE ankle DF/PF, x 30 sec with min UE assist to no assist for short periods    Stand lateral to wall, neutral spine, perform LE slides into door to 90/90 position ( hip flex/abd/er) x 10 reps each side 2 sets add HEP    HEP: as noted above and below:      Assessment:   Pt now using gym shoe for fill time ambulation. Gait asymmetry due to decreased hip strength and control with single leg loading tasks. That was addressed with HEP with LE slides into  wall, tolerated well overall with gluteal fatigue the only complaint, goal 3,4. Pt is unable to initiate single heel raise which was also addressed with HEP of B heel raises, tolerated well,goal 2. No achilles or heel pain.  HO issued for additional HEP. Tx and HEP addressing all goals.         Goals: (to be met in 18 visits)  Pt will improve ankle DF ROM to 10 deg to allow proper heel strike during gait and terminal knee extension in stance so pt can ambulate independently in the community safely.   Pt will improve ankle plantarflexion AROM to WFL as noted with performing full single heel raise, improve ability to ambulate stairs reciprocally, 1 rail assist, 1 flight, with little to no difficulty.   Pt will improve hip and quad strength to 5/5, ankle DF ROM with knee flexed to > 20* so she can perform squatting activities with little to no difficulty.  Pt will improve SLS to >30s to improve safety and independence with gait on uneven surfaces such as grass, able to make quick direction changes with little to no difficulty.   Pt will be independent and compliant with comprehensive HEP to maintain progress achieved in PT    LEFS Score  No data recorded    Plan: Pt to focus on latest HEP. Continue with above focus.

## 2025-01-27 ENCOUNTER — OFFICE VISIT (OUTPATIENT)
Dept: PHYSICAL THERAPY | Age: 55
End: 2025-01-27
Attending: FAMILY MEDICINE
Payer: COMMERCIAL

## 2025-01-27 PROCEDURE — 97110 THERAPEUTIC EXERCISES: CPT

## 2025-01-27 PROCEDURE — 97112 NEUROMUSCULAR REEDUCATION: CPT

## 2025-01-27 NOTE — PROGRESS NOTES
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 ROM and light PRE's exercises sitting or on table, can use bike. Boot on with FWB   Next MD visit:   none scheduled  Date of Surgery: n/a     Insurance Primary/Secondary: [employer]IG[employer] / N/A       Total Timed Treatment: 40 min Total Treatment time: 40 min  Charges: TE 2 NMR 1   Treatment Number: 7    Subjective:     Pt has been steadily increasing her tolerance for standing and walking. Gait is independent with regular shoe wear. Remains with ankle and hip fatigue with walking, decreased balance, general leg fatigue. Has concerns about returning to work and tolerating job duties.   Pain: 0/10     Objective:      TE: 25 min  Moderate gait symmetry L with Trendelenburg type pattern.     Upright bike seat 3 level 1-3 resist x 5 min    Table:  MMT ankle and LE, see below    L ankle PF with blue band  35 reps new HEP  L ankle inver, ever and DF with green band x 20 reps each new HEP    B heel raise cues to emphasize L x 15 reps cont HEP   Unable to initiate single L heel raise    NMR: 15 min  Single leg balance, several trials each best time L 13 seconds     L ASU with contra lateral hip flexion, min UE assist for balance on 6 inch box step x 12 reps new HEP    Stand lateral to wall, neutral spine, perform LE slides into door to 90/90 position ( hip flex/abd/er) x 10 reps each side 2 sets cont HEP    HEP: as noted above and below:      Assessment:    MMT of L ankle is 4/5 globally, unable to initiate single heel raise, goal 2. Performed and issued resisted t band HEP for ankle PRE's. Has 10* ankle DF, goal 1. Single leg  L LE balance best time was 13 seconds, goal 4. HEP issued to address decrease balance. HEP also  progressed to address hip and knee strength with CKC step ups. L knee is 4+/5, L hip is 4/5 to 4+/5 with abduction weakest. 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  HO and band issued for updated HEP. Will focus on new HEP along with previous LE slides into wall for Gmed strengthening.         Goals: (to be met in 18 visits)  Pt will improve ankle DF ROM to 10 deg to allow proper heel strike during gait and terminal knee extension in stance so pt can ambulate independently in the community safely.   Pt will improve ankle plantarflexion AROM to WFL as noted with performing full single heel raise, improve ability to ambulate stairs reciprocally, 1 rail assist, 1 flight, with little to no difficulty.   Pt will improve hip and quad strength to 5/5, ankle DF ROM with knee flexed to > 20* so she can perform squatting activities with little to no difficulty.  Pt will improve SLS to >30s to improve safety and independence with gait on uneven surfaces such as grass, able to make quick direction changes with little to no difficulty.   Pt will be independent and compliant with comprehensive HEP to maintain progress achieved in PT    LEFS Score  No data recorded    Plan: Pt to focus on lnew HEP and previous Gmed wall slides.

## 2025-01-29 ENCOUNTER — OFFICE VISIT (OUTPATIENT)
Dept: PHYSICAL THERAPY | Age: 55
End: 2025-01-29
Attending: FAMILY MEDICINE
Payer: COMMERCIAL

## 2025-01-29 PROCEDURE — 97110 THERAPEUTIC EXERCISES: CPT

## 2025-01-29 PROCEDURE — 97112 NEUROMUSCULAR REEDUCATION: CPT

## 2025-01-29 NOTE — PROGRESS NOTES
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 ROM and light PRE's exercises sitting or on table, can use bike. Boot on with FWB   Next MD visit:   none scheduled  Date of Surgery: n/a     Insurance Primary/Secondary: CIG[employer] / N/A       Total Timed Treatment: 50 min Total Treatment time: 50 min  Charges: TE 3 NMR 1   Treatment Number: 9    Physical Therapy Progress Report  Subjective:        Pt remains with moderate to major difficulties with walking > 15 minutes, standing > 15 minutes. She is unable to perform quick direction changes. Pain at Achilles region is 8/10 at worse as activity progresses through out the day.  Rest and ice, NSAIDS greatly reduce pain. She has concerns about returning to work.    Pt has purchased a balance board for ankle strengthening and balance.  Pain: 0/10     Objective:      TE: 35 min  Minimal gait symmetry L with Trendelenburg type pattern.     Upright bike seat 3 level 1-3 resist x 5 min    Table:  MMT ankle and LE, balance gait, functional strength, complete LEFS QNR    L ankle PF with blue progressed to black band  25 reps progress HEP    L ankle inver, ever and DF with green band x 20 reps each  HEP    B heel raise cues to emphasize L x 15 reps cont HEP   Unable to initiate single L heel raise    NMR: 15 min  Single leg balance, several trials each best time L 15 seconds HEP     L ASU with contra lateral hip flexion, min UE assist for balance on 6 inch box step x 12 progressed to 15  reps  HEP    Stand lateral to wall, neutral spine, perform LE slides into door to 90/90 position ( hip flex/abd/er) x 10 progressed to 20 reps each side  HEP    HEP: as noted above and below:    LEFS Score  LEFS Score: (Patient-Rptd)  40 % (1/29/2025  2:36 PM)      Assessment: Pt has completed 9 sessions of PT following achilles tendon repair on 10/29/204. Progress steady with improvements noted with ankle strength balance and ability to ambulate independently. Pt currently ambulates independently. Gait with minimal asymmetry due to decrease gastroc/soleus strength and hip strength.  Achilles region pain at worse 8/10 as day progresses with activities. Marked decrease in functional strength with pt unable to initiate single heel raise. 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     LEFS QNR score of 40% indicates moderate to severe functional limitations.     MMT of L ankle is 4/5 globally, unable to initiate single heel raise, 15* ankle DF, single leg balance 15 seconds, L hip abduction and extension strength 4-/5.    Recommend pt continue PT for 4 more weeks along with returning to her fitness center to return to regular exercise.          Goals: (to be met in 18 visits)  Pt will improve ankle DF ROM to 10 deg to allow proper heel strike during gait and terminal knee extension in stance so pt can ambulate independently in the community safely.   Pt will improve ankle plantarflexion AROM to WFL as noted with performing full single heel raise, improve ability to ambulate stairs reciprocally, 1 rail assist, 1 flight, with little to no difficulty.   Pt will improve hip and quad strength to 5/5, ankle DF ROM with knee flexed to > 20* so she can perform squatting activities with little to no difficulty.  Pt will improve SLS to >30s to improve safety and independence with gait on uneven surfaces such as grass, able to make quick direction changes with little to no difficulty.   Pt will be independent and compliant with comprehensive HEP to maintain progress achieved in PT    Plan: f/u with surgeon, await recommendations.

## 2025-01-30 ENCOUNTER — OFFICE VISIT (OUTPATIENT)
Dept: PODIATRY CLINIC | Facility: CLINIC | Age: 55
End: 2025-01-30

## 2025-01-30 DIAGNOSIS — S86.012A PARTIAL ACHILLES TENDON TEAR, LEFT, INITIAL ENCOUNTER: ICD-10-CM

## 2025-01-30 DIAGNOSIS — S86.012A RUPTURE OF LEFT ACHILLES TENDON, INITIAL ENCOUNTER: ICD-10-CM

## 2025-01-30 DIAGNOSIS — Z98.890 STATUS POST FOOT SURGERY: ICD-10-CM

## 2025-01-30 DIAGNOSIS — M65.28 CALCIFIC ACHILLES TENDINITIS OF LEFT LOWER EXTREMITY: ICD-10-CM

## 2025-01-30 DIAGNOSIS — Z47.89 ORTHOPEDIC AFTERCARE: Primary | ICD-10-CM

## 2025-01-30 PROCEDURE — 99024 POSTOP FOLLOW-UP VISIT: CPT | Performed by: STUDENT IN AN ORGANIZED HEALTH CARE EDUCATION/TRAINING PROGRAM

## 2025-01-30 RX ORDER — METHYLPREDNISOLONE 4 MG/1
TABLET ORAL
Qty: 21 TABLET | Refills: 0 | Status: SHIPPED | OUTPATIENT
Start: 2025-01-30

## 2025-01-30 NOTE — PROGRESS NOTES
[facility]  Progress Note  11/29/2024    Chief Complaint   Patient presents with    Follow - Up     Patient is here to follow up from surgery on 10/29/24. She is in Physical Therapy, with success. She rates pain 5/10 when weightbearing. She does get swelling at times.         HPI:     Pt is a pleasant 54 year old female who presents for post-op visit s/p left Achilles detach/reattach with calcaneal exostectomy on 9/3/2024 with revision/repair of partial Achilles tendon tear to left lower extremity on 10/29/2024.  Patient relates that she is doing well and is without pain at this time.  She does still experience fatigue and weakness after being on her feet for longer periods of time.  She is completing physical therapy with some improvement.  Her pain can reach a 5 out of 10 after weightbearing for a while.  She is concerned about returning to work.  No other complaints are mentioned.    Allergies: Patient has no active allergies.   Current Outpatient Medications   Medication Sig Dispense Refill    aspirin 325 MG Oral Tab Take 1 tablet (325 mg total) by mouth daily.      ergocalciferol 1.25 MG (97554 UT) Oral Cap Take 1 capsule (50,000 Units total) by mouth once a week. 4 capsule 2    sertraline 50 MG Oral Tab Take 1 tablet (50 mg total) by mouth daily. 90 tablet 3    Cholecalciferol (VITAMIN D) 50 MCG (2000 UT) Oral Cap Take by mouth.        Past Medical History:    Anxiety    H. pylori infection    Headache disorder    Heartburn    Hemorrhoids    Stress    Visual impairment    Wears glasses      Past Surgical History:   Procedure Laterality Date    Eye surgery      Foot surgery Left 2024    Other surgical history  2022    colonscopy      Family History   Problem Relation Age of Onset    Heart Disorder Father     Diabetes Father     Heart Attack Father     Other (Other) Father 69        parkinsons    Cancer Mother         non-hodgkins lymphoma    Hypertension Mother     Prostate Cancer Brother        Social History     Socioeconomic History    Marital status:    Tobacco Use    Smoking status: Never    Smokeless tobacco: Never   Vaping Use    Vaping status: Never Used   Substance and Sexual Activity    Alcohol use: Yes     Alcohol/week: 3.0 standard drinks of alcohol     Types: 3 Cans of beer per week     Comment: a beer a day    Drug use: No    Sexual activity: Yes     Partners: Male           REVIEW OF SYSTEMS:     10 point ROS completed and was negative, except for pertinent positive and negatives stated in subjective.       EXAM:     GENERAL: well developed, well nourished, in no apparent distress        EXTREMITIES:  1. Integument: Normal skin temperature and turgor. Incision is well healed.  2. Vascular: Dorsalis pedis 2/4 bilateral and posterior tibial pulses 2/4 bilateral, capillary refill normal.  No major edema today.  3. Neurological: Normal sharp dull sensation; reflexes normal  4. Musculoskeletal: Achilles tendon repair remains intact.  Patient able to plantarflex without incident.  Compartments are soft and compressible.  No dell or other concerns. No strength deficits or other concerns.     ASSESSMENT AND PLAN:   Diagnoses and all orders for this visit:    Orthopedic aftercare    Status post foot surgery    Calcific Achilles tendinitis of left lower extremity    Partial Achilles tendon tear, left, initial encounter    Rupture of left Achilles tendon, initial encounter          Plan:   -Patient was seen and evaluated today in clinic.  Chart history reviewed.  -Status post  left Achilles detach/reattach with calcaneal exostectomy on 9/3/2024 and revision/repair of partial Achilles tendon tear to left lower extremity on 10/29/2024   -Site inspected.  Noted to be healing well without concerns at this time.   -Patient has transition to normal tennis shoes and was taught this well.  She does have excellent strength to her Achilles tendon.  -However, patient is overall weak to her left lower  extremity after being immobilized in a boot for a long period of time.  -She should continue physical therapy exercises to work on strengthening.  -Continue compression sleeve as needed.  -Will Rx Medrol Dosepak to help calm down inflammation.  -If she continues to improve could tentatively return to work on 2/17/2025.  Her job is very high demand and so we will have to closely monitor site.  If she fails to progress as we would like we may have to push back return to work date.  -Recommend patient start going to the gym to work on biking/swimming to build up strength to her left calf.    All questions answered to satisfaction.  Appreciate the opportunity to be in patient's care.  Will continue to follow.    RTC 2 to 3 weeks.      [employer] speech recognition software was used to prepare this note.  Errors in word recognition may occur.  Please contact me with any questions/concerns with this note.

## 2025-02-03 ENCOUNTER — OFFICE VISIT (OUTPATIENT)
Dept: PHYSICAL THERAPY | Age: 55
End: 2025-02-03
Attending: STUDENT IN AN ORGANIZED HEALTH CARE EDUCATION/TRAINING PROGRAM
Payer: COMMERCIAL

## 2025-02-03 PROCEDURE — 97112 NEUROMUSCULAR REEDUCATION: CPT

## 2025-02-03 PROCEDURE — 97110 THERAPEUTIC EXERCISES: CPT

## 2025-02-03 NOTE — PROGRESS NOTES
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 ROM and light PRE's exercises sitting or on table, can use bike. Boot on with FWB   Next MD visit:   none scheduled  Date of Surgery: n/a     Insurance Primary/Secondary: [employer] / N/A       Total Timed Treatment: 40 min Total Treatment time: 40 min  Charges: TE 1 NMR 2   Treatment Number: 10    Subjective:        Had f/u with surgeon, issued steroids/Medrol dose pack for her achilles pain and edema, well improved. Will return to work in 2-3 weeks. Has returned to her fitness center for biking for 30 minutes and swimming fo exercise.   Pt has purchased a balance board for ankle strengthening and balance.    Pain: 0/10     Objective:      TE: 15 min  Minimal gait symmetry L with Trendelenburg type pattern.     Upright bike seat 3,  level 3 resist x 5 min    L ankle PF with blue progressed to black band  25 reps  HEP    L ankle inver, ever and DF with green band x 20 reps each  HEP    B heel raise cues to emphasize L x 15 reps cont HEP   Unable to initiate single L heel raise    Shuttle:  L leg press 5 of 8 bands x 15 reps  L single raise 2 of 8 bands x 10  3 of 8 bands x 10  4 of 8 bands x 10      NMR: 25 min  Single leg balance, several trials each best time L > 30 seconds HEP     Stand lateral to wall, neutral spine, perform LE slides into door to 90/90 position ( hip flex/abd/er) x 10 progressed to 20 reps each side  HEP    L ASD/RSU on 4 inch box step,  B UE reduced to R only minimal x 10 3 sets   As above with 6 inch box step x 5 hold, poor control.     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 Pt with difficulty descending stairs wit simulation on box steps due to decreased quad and gastroc soleus strength. L quad strength 4/5 with MMT of knee extension. Single leg balance well improved, now > 30 seconds.     1/29 MMT of L ankle is 4/5 globally, unable to initiate single heel raise, 15* ankle DF, single leg balance 15 seconds, L hip abduction and extension strength 4-/5.            Goals: (to be met in 18 visits)  Pt will improve ankle DF ROM to 10 deg to allow proper heel strike during gait and terminal knee extension in stance so pt can ambulate independently in the community safely.   Pt will improve ankle plantarflexion AROM to WFL as noted with performing full single heel raise, improve ability to ambulate stairs reciprocally, 1 rail assist, 1 flight, with little to no difficulty.   Pt will improve hip and quad strength to 5/5, ankle DF ROM with knee flexed to > 20* so she can perform squatting activities with little to no difficulty.  Pt will improve SLS to >30s to improve safety and independence with gait on uneven surfaces such as grass, able to make quick direction changes with little to no difficulty.   Pt will be independent and compliant with comprehensive HEP to maintain progress achieved in PT    Plan: improve ability to raise heel, descend steps, overall L leg strength. .

## 2025-02-05 ENCOUNTER — OFFICE VISIT (OUTPATIENT)
Dept: PHYSICAL THERAPY | Age: 55
End: 2025-02-05
Attending: FAMILY MEDICINE
Payer: COMMERCIAL

## 2025-02-05 PROCEDURE — 97110 THERAPEUTIC EXERCISES: CPT

## 2025-02-05 PROCEDURE — 97112 NEUROMUSCULAR REEDUCATION: CPT

## 2025-02-05 NOTE — PROGRESS NOTES
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 ROM and light PRE's exercises sitting or on table, can use bike. Boot on with FWB   Next MD visit:   none scheduled  Date of Surgery: n/a     Insurance Primary/Secondary: [employer] / N/A       Total Timed Treatment: 40 min Total Treatment time: 40 min  Charges: TE 1 NMR 2   Treatment Number: 11     Subjective:        Pt using pool few days a week, ambulating stairs for exercise with emphasis on improving descending stairs reciprically with better control.    Pt has purchased a balance board for ankle strengthening and balance.    Pain: 0/10     Objective:      TE: 15 min  Minimal gait symmetry L with Trendelenburg type pattern.     Upright bike seat 3,  level 3 resist x 5 min    L ankle PF with blue progressed to black band  25 reps  HEP    L ankle inver, ever and DF with green band x 20 reps each  HEP    B heel raise cues to emphasize L x 15 reps cont HEP   Unable to initiate single L heel raise    Shuttle:  L leg press 5 of 8 bands x 15 reps  L single raise 2 of 8 bands x 10  3 of 8 bands x 10  4 of 8 bands x 10      NMR: 25 min  Single leg balance, several trials each best time L > 30 seconds HEP     Stand lateral to wall, neutral spine, perform LE slides into door to 90/90 position ( hip flex/abd/er) x 10 progressed to 20 reps each side  HEP    L ASD/RSU on 4 inch box step,  B UE reduced to R only minimal x 10    As above with 6 inch box step x 10 2 sets     L ASU with contra lateral hip flexion, min UE assist for balance on 6 x 15 reps, 2nd set progressed to 8 inch box step x  15  reps  HEP    HEP: as noted above and below:    LEFS Score  LEFS Score: (Patient-Rptd) 40 % (1/29/2025  2:36  PM)      Assessment: Better with descending stairs with simulation on box steps but with still with some difficulty and need for light UE assist till unable to initiate single heel raise. Will most likely need several months for that to return to long immobilization period.   L quad strength 4/5 with MMT of knee extension. Single leg balance consistent with  now > 30 seconds.     1/29 MMT of L ankle is 4/5 globally, unable to initiate single heel raise, 15* ankle DF, single leg balance 15 seconds, L hip abduction and extension strength 4-/5.            Goals: (to be met in 18 visits)  Pt will improve ankle DF ROM to 10 deg to allow proper heel strike during gait and terminal knee extension in stance so pt can ambulate independently in the community safely.   Pt will improve ankle plantarflexion AROM to WFL as noted with performing full single heel raise, improve ability to ambulate stairs reciprocally, 1 rail assist, 1 flight, with little to no difficulty.   Pt will improve hip and quad strength to 5/5, ankle DF ROM with knee flexed to > 20* so she can perform squatting activities with little to no difficulty.  Pt will improve SLS to >30s to improve safety and independence with gait on uneven surfaces such as grass, able to make quick direction changes with little to no difficulty.   Pt will be independent and compliant with comprehensive HEP to maintain progress achieved in PT    Plan: improve ability to raise heel, descend steps, overall L leg strength. .

## 2025-02-10 ENCOUNTER — OFFICE VISIT (OUTPATIENT)
Dept: PHYSICAL THERAPY | Age: 55
End: 2025-02-10
Attending: STUDENT IN AN ORGANIZED HEALTH CARE EDUCATION/TRAINING PROGRAM
Payer: COMMERCIAL

## 2025-02-10 PROCEDURE — 97110 THERAPEUTIC EXERCISES: CPT

## 2025-02-10 PROCEDURE — 97112 NEUROMUSCULAR REEDUCATION: CPT

## 2025-02-10 NOTE — PROGRESS NOTES
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 ROM and light PRE's exercises sitting or on table, can use bike. Boot on with FWB   Next MD visit:   none scheduled  Date of Surgery: n/a     Insurance Primary/Secondary: [employer] / N/A       Total Timed Treatment: 50 min Total Treatment time: 50 min  Charges: TE 1 NMR 3   Treatment Number: 12     Subjective:         Pt walked outside on trials, made ~ 1/2 mile before needed rest, then completed another 1/2 mile.   Pain: 0/10     Objective:      TE: 15 min  Minimal gait symmetry L with Trendelenburg type pattern.     Upright bike seat 3,  level 3 resist x 5 min    L ankle PF with blue progressed to black band  25 reps  HEP    L ankle inver, ever and DF with green band x 20 reps each  HEP    B heel raise cues to emphasize L x 15 reps cont HEP   Unable to initiate single L heel raise    Shuttle:  L leg press  of 8 bands x 20 reps  L single raise 3 of 8 bands x 20  4 of 8 bands x 10      NMR: 35 min  Single leg balance, several trials each best time L > 30 seconds HEP     Stand lateral to wall, neutral spine, perform LE slides into door to 90/90 position ( hip flex/abd/er) x 10 progressed to 20 reps each side  HEP    L ASU 6 inch box step x  15  reps  HEP    L LSU/LSD 6 inch x 10 each way    L ASD/RSU on 6 inch box step,  light R only minimal x 15     Rebounder, throw catch #2 ball, all 3 directions, B each direction x 10 each single leg x 10 each direction each side        HEP: as noted above and below:    LEFS Score  LEFS Score: (Patient-Rptd) 40 % (1/29/2025  2:36 PM)      Assessment: Progressed program to dynamic balance exercises with rebounder, tolerated fair overall.  Unable to initiate single heel  raise, 15* ankle DF, single leg balance >30 seconds, goal 4 L hip abduction and extension strength improved to 4/5, goal 3.          Goals: (to be met in 18 visits)  Pt will improve ankle DF ROM to 10 deg to allow proper heel strike during gait and terminal knee extension in stance so pt can ambulate independently in the community safely.   Pt will improve ankle plantarflexion AROM to WFL as noted with performing full single heel raise, improve ability to ambulate stairs reciprocally, 1 rail assist, 1 flight, with little to no difficulty.   Pt will improve hip and quad strength to 5/5, ankle DF ROM with knee flexed to > 20* so she can perform squatting activities with little to no difficulty.  Pt will improve SLS to >30s to improve safety and independence with gait on uneven surfaces such as grass, able to make quick direction changes with little to no difficulty.   Pt will be independent and compliant with comprehensive HEP to maintain progress achieved in PT    Plan: improve ability to raise heel, descend steps, overall L leg strength. .

## 2025-02-27 ENCOUNTER — OFFICE VISIT (OUTPATIENT)
Dept: PHYSICAL THERAPY | Age: 55
End: 2025-02-27
Attending: STUDENT IN AN ORGANIZED HEALTH CARE EDUCATION/TRAINING PROGRAM
Payer: COMMERCIAL

## 2025-02-27 PROCEDURE — 97110 THERAPEUTIC EXERCISES: CPT

## 2025-02-27 PROCEDURE — 97112 NEUROMUSCULAR REEDUCATION: CPT

## 2025-02-27 NOTE — PROGRESS NOTES
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 ROM and light PRE's exercises sitting or on table, can use bike. Boot on with FWB   Next MD visit:   none scheduled  Date of Surgery: n/a     Insurance Primary/Secondary: [employer] / N/A       Total Timed Treatment: 50 min Total Treatment time: 50 min  Charges: TE 2 NMR 2   Treatment Number: 13     Subjective:       Pt is performing regular conditioning exercises (walking track, stairs, swimming) and ankle strength exercises in pool at fitness center. Preparing to return to work. Has questions and concerns about returning to work. No achilles or ankle pain now. Still has regular ankle edema at end of day.   Pain: 0/10     Objective:      TE: 25 min  Slight gait symmetry     Upright bike seat 3,  level 3 resist x 5 min    Table:  MMT L ankle all 5/5  MMT L hip and knee globally > 4/5      B heel raise cues to emphasize L x 15 reps cont HEP   Unable to initiate single L heel raise    Fitness Center:    L leg press  10 lbs 0 to 90 x 15 reps add to program      NMR: 25 min  Single leg balance, several trials each best time L > 30 seconds HEP     Stand lateral to wall, neutral spine, perform LE slides into door to 90/90 position ( hip flex/abd/er) x 10 progressed to 20 reps each side  HEP    [facility]itness Center: add to program  Balance board:  B LE ankle DF/PF, x 2 min progressed from min UE assist to no assist for short periods  B LE ankle inver/ever, x 2 min progressed from min UE assist to no assist for short periods  L LE ankle DF/PF, x 30 sec with min UE assist to no assist for short periods     HEP: as noted above and below:    LEFS Score  LEFS Score: (Patient-Rptd) 40 % (1/29/2025  2:36 PM)      Assessment:  Discussed pt's concerns for return to work. Pt appears appropriate for return to work with 4 hour shifts, assess tolerance after first week, progress hours accordingly.     Pt with good HEP compliancy with fitness center program which was further progressed to today to promote ankle strength, LE strength to meet unmet goals. Pt remains with profound loss of function ankle strength with unable to initiate single heel raise.          Goals: (to be met in 18 visits)  Pt will improve ankle DF ROM to 10 deg to allow proper heel strike during gait and terminal knee extension in stance so pt can ambulate independently in the community safely  Met   Pt will improve ankle plantarflexion AROM to WFL as noted with performing full single heel raise, improve ability to ambulate stairs reciprocally, 1 rail assist, 1 flight, with little to no difficulty. Not met  Pt will improve hip and quad strength to 5/5, ankle DF ROM with knee flexed to > 20* so she can perform squatting activities with little to no difficulty.  Met for ankle DF  Pt will improve SLS to >30s to improve safety and independence with gait on uneven surfaces such as grass, able to make quick direction changes with little to no difficulty.  Met  Pt will be independent and compliant with comprehensive HEP to maintain progress achieved in PT    Plan:progress accordingly to meet unmet goals.

## 2025-03-06 ENCOUNTER — OFFICE VISIT (OUTPATIENT)
Dept: PHYSICAL THERAPY | Age: 55
End: 2025-03-06
Attending: STUDENT IN AN ORGANIZED HEALTH CARE EDUCATION/TRAINING PROGRAM
Payer: COMMERCIAL

## 2025-03-06 PROCEDURE — 97116 GAIT TRAINING THERAPY: CPT

## 2025-03-06 PROCEDURE — 97110 THERAPEUTIC EXERCISES: CPT

## 2025-03-06 PROCEDURE — 97112 NEUROMUSCULAR REEDUCATION: CPT

## 2025-03-07 NOTE — PROGRESS NOTES
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 ROM and light PRE's exercises sitting or on table, can use bike. Boot on with FWB   Next MD visit:   none scheduled  Date of Surgery: n/a     Insurance Primary/Secondary: [employer] / N/A       Total Timed Treatment: 50 min Total Treatment time: 50 min  Charges: TE 2 NMR 2   Treatment Number: 14     Subjective:       Has returned to work and is working 4 hour shifts 3 days a week. Still performing conditioning exercises (walking track, stairs, swimming) and ankle strength exercises in pool at fitness center. Has some general LE fatigue, greatest at achilles at end of shift depending on amount of activity. Can have achilles pain when has increased fatigue. No pain now. Continues to ice and elevate daily.   Pain: 0/10     Objective:      TE: 25 min  Min gait symmetry     Upright bike seat 3,  level 3 resist x 5 min    Table:  MMT L ankle all 5/5  MMT L hip and knee globally 5/5 except hip abduction and extension  is 4/5    Stand:  B heel raise cues to emphasize L x 15 reps cont HEP   Unable to initiate single L heel raise    Shuttle:  L heel raise 2 of 8 band x 15 2 sets   Again with 3 of 8 bands x 5 hold   Again 4 of 8 bands unable to, hold      NMR: 10 min  Instruct on and demonstrate lateral walks with blue band at ankles   Have pt perform x 20 ft L and R, 4 sets add HEP    Gait:10 min  Instruct on and demonstrate gait with erect posture, equal stance time, heel lift in push off stage    Have pt practice with v/c's, use mirror for visual feedback         HEP: as noted above and below:  Fitness Center:  L leg press  10 lbs 0 to 90 x 15 reps add to program  Balance board:  B LE ankle DF/PF, x 2 min  progressed from min UE assist to no assist for short periods  B LE ankle inver/ever, x 2 min progressed from min UE assist to no assist for short periods  L LE ankle DF/PF, x 30 sec with min UE assist to no assist for short periods    LEFS Score  LEFS Score: (Patient-Rptd) 40 % (1/29/2025  2:36 PM)      Assessment: Pt has returned to modified work schedule with fair tolerance overall. Maintains LE strength except shows decreased hip and abduction versus previous testing. Pt's gait asymmetry increases with fatigue as noted in clinic today. Resorts to Trendelenburg type pattern, no heel toe pattern. That can be corrected with cueing including visual feedback with mirror. HEP was progressed with hip PRE with lateral walk , HO and band issued. Pt progressing towards unmet hip strength goals. No change in functional ankle strength, no single heel raise, goal not met and most likely will not be met. Will consider discharge once hip strength goal met.           Goals: (to be met in 18 visits)  Pt will improve ankle DF ROM to 10 deg to allow proper heel strike during gait and terminal knee extension in stance so pt can ambulate independently in the community safely  Met   Pt will improve ankle plantarflexion AROM to WFL as noted with performing full single heel raise, improve ability to ambulate stairs reciprocally, 1 rail assist, 1 flight, with little to no difficulty. Not met  Pt will improve hip and quad strength to 5/5, ankle DF ROM with knee flexed to > 20* so she can perform squatting activities with little to no difficulty.  Met for ankle DF  Pt will improve SLS to >30s to improve safety and independence with gait on uneven surfaces such as grass, able to make quick direction changes with little to no difficulty.  Met  Pt will be independent and compliant with comprehensive HEP to maintain progress achieved in PT    Plan:progress accordingly to meet unmet goals.

## 2025-03-13 ENCOUNTER — OFFICE VISIT (OUTPATIENT)
Dept: PHYSICAL THERAPY | Age: 55
End: 2025-03-13
Attending: STUDENT IN AN ORGANIZED HEALTH CARE EDUCATION/TRAINING PROGRAM
Payer: COMMERCIAL

## 2025-03-13 PROCEDURE — 97112 NEUROMUSCULAR REEDUCATION: CPT

## 2025-03-13 PROCEDURE — 97110 THERAPEUTIC EXERCISES: CPT

## 2025-03-13 NOTE — PROGRESS NOTES
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 ROM and light PRE's exercises sitting or on table, can use bike. Boot on with FWB   Next MD visit:   none scheduled  Date of Surgery: n/a     Insurance Primary/Secondary: [employer] / N/A       Total Timed Treatment: 40 min Total Treatment time: 40 min  Charges: TE 2 NMR 1   Treatment Number: 15 of 18     Subjective:       Pt reports that she continues to improve with strength, balance and pain. Pain ranges from 0 to 5/10. Walks 2 blocks before ankle weakness does not allow push off with gait. No work  Pain: 0/10     Objective:      TE: 25 min  slight gait symmetry     Upright bike seat 3,  level 3 resist x 5 min    Table:  MMT L ankle all 5/5  MMT L hip and knee globally 5/5 except hip abduction and extension  is 4+/5    Stand:  B heel raise cues to emphasize L x 15 reps cont HEP   Unable to initiate single L heel raise    Shuttle:  L heel raise 4 of 8 band x 10    Again with 3 of 8 bands x 10    Again 2 of 8 bands x 10  L leg press   6 of 8 bands x 10   7of 8 bands x 10   8 of 8 bands x 10   NMR: 10 min    NMR: 15 min  Lateral walks with ADIM, 1/4 squat, blue band at ankles x 25 ft L and R, 4 sets HEP    Rebounder:  Single leg stance L and R LE's, throw catch #2 ball, all 3 directions x 10 reps each side each direction         HEP: as noted above and below:  Fitness Center:  L leg press  10 lbs 0 to 90 x 15 reps add to program  Balance board:  B LE ankle DF/PF, x 2 min progressed from min UE assist to no assist for short periods  B LE ankle inver/ever, x 2 min progressed from min UE assist to no assist for short periods  L LE ankle DF/PF, x 30 sec with min UE assist to no assist for short periods    LEFS  Score  LEFS Score: (Patient-Rptd) 40 % (1/29/2025  2:36 PM)      Assessment: Pt with improved hip strength, goal 3. Pt tolerating tx better this week due to decreased work schedule. Fatigue at ankle at end of session decreased gait symmetry from slight to minimal asymmetry at end of session but no pain.          Goals: (to be met in 18 visits)  Pt will improve ankle DF ROM to 10 deg to allow proper heel strike during gait and terminal knee extension in stance so pt can ambulate independently in the community safely  Met   Pt will improve ankle plantarflexion AROM to WFL as noted with performing full single heel raise, improve ability to ambulate stairs reciprocally, 1 rail assist, 1 flight, with little to no difficulty. Not met  Pt will improve hip and quad strength to 5/5, ankle DF ROM with knee flexed to > 20* so she can perform squatting activities with little to no difficulty.  Met for ankle DF  Pt will improve SLS to >30s to improve safety and independence with gait on uneven surfaces such as grass, able to make quick direction changes with little to no difficulty.  Met  Pt will be independent and compliant with comprehensive HEP to maintain progress achieved in PT    Plan:progress accordingly to meet unmet goals.

## 2025-03-20 ENCOUNTER — OFFICE VISIT (OUTPATIENT)
Dept: PHYSICAL THERAPY | Age: 55
End: 2025-03-20
Attending: STUDENT IN AN ORGANIZED HEALTH CARE EDUCATION/TRAINING PROGRAM
Payer: COMMERCIAL

## 2025-03-20 PROCEDURE — 97112 NEUROMUSCULAR REEDUCATION: CPT

## 2025-03-20 PROCEDURE — 97110 THERAPEUTIC EXERCISES: CPT

## 2025-03-20 NOTE — PROGRESS NOTES
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 ROM and light PRE's exercises sitting or on table, can use bike. Boot on with FWB   Next MD visit:   none scheduled  Date of Surgery: n/a     Insurance Primary/Secondary: [employer] / N/A       Total Timed Treatment: 40 min Total Treatment time: 40 min  Charges: TE 2 NMR 1   Treatment Number: 16 of 18     Subjective:       Pt tolerating return to work fair to good. Most day the first 2 hours of a 4 hour shift there is no pain or fatigue. By 3rd hour fatigue, 4th hour fatigue fatigue, pain and mild limping.   Pain: 0/10     Objective:      TE: 25 min  slight gait symmetry     Upright bike seat 3,  level 3 resist x 5 min    Table:  MMT L ankle all 5/5  MMT L hip and knee globally 5/5 except hip abduction and extension  is 5/5    Stand:  Unable to initiate single L heel raise    Shuttle:  L heel raise 4 of 8 band x 10    Again with 3 of 8 bands x 10    Again 2 of 8 bands x 15  L leg press   6 of 8 bands x 10   7of 8 bands x 10   8 of 8 bands x 10     NMR: 10 min    NMR: 15 min  Lateral walks with ADIM, 1/4 squat, blue band at ankles x 25 ft L and R, 4 sets HEP    Rebounder:  Single leg stance L and R LE's, throw catch #2 ball, all 3 directions x 10 reps each side each direction         HEP: as noted above and below:  Fitness Center:  L leg press  10 lbs 0 to 90 x 15 reps add to program  Balance board:  B LE ankle DF/PF, x 2 min progressed from min UE assist to no assist for short periods    B LE ankle inver/ever, x 2 min progressed from min UE assist to no assist for short periods  L LE ankle DF/PF, x 30 sec with min UE assist to no assist for short periods    LEFS Score  LEFS Score: (Patient-Rptd) 40 %  (1/29/2025  2:36 PM)      Assessment: Pt again improved hip strength, goal 3 met. Pt tolerating work schedule some better but still with fatigue and some difficulty with the end of a busy 4 hour shift. Pt's goals is return to 12 hours.         Goals: (to be met in 18 visits)  Pt will improve ankle DF ROM to 10 deg to allow proper heel strike during gait and terminal knee extension in stance so pt can ambulate independently in the community safely  Met   Pt will improve ankle plantarflexion AROM to WFL as noted with performing full single heel raise, improve ability to ambulate stairs reciprocally, 1 rail assist, 1 flight, with little to no difficulty. Not met  Pt will improve hip and quad strength to 5/5, ankle DF ROM with knee flexed to > 20* so she can perform squatting activities with little to no difficulty.  Met for ankle DF  Pt will improve SLS to >30s to improve safety and independence with gait on uneven surfaces such as grass, able to make quick direction changes with little to no difficulty.  Met  Pt will be independent and compliant with comprehensive HEP to maintain progress achieved in PT    Plan:progress accordingly to meet unmet goals.

## 2025-03-27 ENCOUNTER — APPOINTMENT (OUTPATIENT)
Dept: PHYSICAL THERAPY | Age: 55
End: 2025-03-27
Attending: STUDENT IN AN ORGANIZED HEALTH CARE EDUCATION/TRAINING PROGRAM
Payer: COMMERCIAL

## 2025-04-14 ENCOUNTER — TELEPHONE (OUTPATIENT)
Dept: PODIATRY CLINIC | Facility: CLINIC | Age: 55
End: 2025-04-14

## 2025-04-14 DIAGNOSIS — M76.62 CALCIFIC ACHILLES TENDINITIS OF LEFT LOWER EXTREMITY: ICD-10-CM

## 2025-04-14 DIAGNOSIS — S86.012A PARTIAL ACHILLES TENDON TEAR, LEFT, INITIAL ENCOUNTER: ICD-10-CM

## 2025-04-14 DIAGNOSIS — Z47.89 ORTHOPEDIC AFTERCARE: Primary | ICD-10-CM

## 2025-04-14 NOTE — TELEPHONE ENCOUNTER
Spoke with patient on telephone.  She has tolerated an increase in her work load well.  She is now working 8 and 4-hour shifts and doing well with these.   Patient requesting to return to 12-hour shifts next week.  Work note placed in chart.

## 2025-04-22 ENCOUNTER — TELEPHONE (OUTPATIENT)
Dept: PODIATRY CLINIC | Facility: CLINIC | Age: 55
End: 2025-04-22

## 2025-05-19 ENCOUNTER — LAB ENCOUNTER (OUTPATIENT)
Dept: LAB | Age: 55
End: 2025-05-19
Attending: PREVENTIVE MEDICINE
Payer: COMMERCIAL

## (undated) NOTE — LETTER
Patient Name: Kristen Hooks  YOB: 1970          MRN number:  [id number]  Date:  12/10/2024  Referring Physician:  Alexei Kang         LOWER EXTREMITY EVALUATION:     Diagnosis:    Calcific Achilles tendinitis of left lower extremity (M65.28)  Retrocalcaneal bursitis, left (M77.52)  Inflammatory heel pain, left (M79.672)         Referring Provider: Alexei Kang  Date of Evaluation:    12/9/2024    Precautions:    Slow progression of out of boot exercises done sitting or supine. ROM and light PRE's exercises sitting or on table, can use bike. Boot on with FWB   Next MD visit:   none scheduled  Date of Surgery: n/a     PATIENT SUMMARY   Kristen Hooks is a 54 year old female who presents to therapy today s/p two recent surgeries for Achilles tendon. Initial surgery was Achilles detachment and reattachment for calcaneal exostectomy on 9/3/2024. 2nd surgery was to repair partial Achilles rupture on 10 /29/2024. Pt is currently ambulating in CAM boot with 1/4\" heel lift, B axillary crutches, WBAT. Pt reports no pain currently. Prior to surgeries pt had chronic achilles pain that limited mobility. 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   Will    Pt describes pain level current 1/10, at best 0/10, at worst 3-4/10.   Current functional limitations include inability to ambulate independently on level surfaces and stairs. Difficulty with transfers, squatting activities. Unable to work.     Kristen describes prior level of function chronic heel pain limited mobility. Independent with all mobility prior to surgery.   Pt goals include restore independent mobility w/o restrictions.   Past medical history was reviewed with Kristen.     ASSESSMENT  Kristen is s/p two recent surgeries for Achilles tendon. Initial surgery was Achilles detachment and reattachment for calcaneal exostectomy on 9/3/2024. 2nd surgery was to repair partial Achilles rupture on 10 /29/2024. Pt is currently ambulating  in CAM boot with 1/4\" heel lift, B axillary crutches, WBAT. Pt reports no pain currently. Prior to surgeries pt had chronic achilles pain that limited mobility.    The results of the objective tests and measures show global loss of ankle foot ROM and strength. Mild ankle edema present. There is decreased hip and knee strength. Limited exam due to post surgical precautions. Pt with no Achilles pain with exam. Incision healing.     Functional deficits include but are not limited to inability to ambulate independently on level surfaces and stairs. Difficulty with transfers, squatting activities. Unable to work.     Signs and symptoms are consistent with diagnosis of mobility dysfunction due to Achilles repair. Pt and PT discussed evaluation findings, pathology, POC and HEP.  Pt voiced understanding and performs HEP correctly without reported pain. Skilled Physical Therapy is medically necessary to address the above impairments and reach functional goals.     OBJECTIVE:   Observation: ambulates with CAM boot, axillary crutches. 1/4 inch heel lift in boot. Healing incision, no drainage or signs of infection.   Palpation: negative at foot and ankle  Ankle girth: figure 8 R 57.0 cm    AROM:   Hip Knee Foot/Ankle   L hip all motions WNL WNL DF:   L 0  PF: R L 40  INV: R  L 20  EV: R L 20  Great toe ext:  L 55       Flexibility:    Gastroc-soleus:  L decreased    Strength/MMT:  Hip Knee Foot/Ankle   L hip 3+/5 to 4/5 with abduction, ER and EXT weakest 4/5    Globally all < 3/5 at ankle  Great toe is 4/5       Today’s Treatment and Response:   Pt education was provided on exam findings, treatment diagnosis, treatment plan, expectations, and prognosis.     Patient was instructed in, performed and issued a HEP for L hip SLR flexion with TKE maintained x 10 reps in supine. L hip IR with reverse clam x 10 and ER with clam x 10, L hip ABD x 5 reps 2 sets, all in R side lying. HO issued for HEP.     Charges: PT Eval Low Complexity,  TE 1      Total Timed Treatment: 12 min     Total Treatment Time: 45 min     PLAN OF CARE:    Goals: (to be met in 18 visits)  Pt will improve ankle DF ROM to 10 deg to allow proper heel strike during gait and terminal knee extension in stance so pt can ambulate independently in the community safely.   Pt will improve flexion AROM to WFL as noted with performing full single heel raise, improve ability to ambulate stairs reciprocally, 1 rail assist, 1 flight, with little to no difficulty.   Pt will improve hip and quad strength to 5/5, ankle DF ROM with knee flexed to > 20* so she can perform squatting activities with little to no difficulty.  Pt will improve SLS to >30s to improve safety and independence with gait on uneven surfaces such as grass, able to make quick direction changes with little to no difficulty.   Pt will be independent and compliant with comprehensive HEP to maintain progress achieved in PT    Frequency / Duration: Patient will be seen for 1-2 x/week or a total of 18 visits over a 90 day period. Treatment will include: Manual Therapy, Neuromuscular Re-education, Therapeutic Exercise, and Home Exercise Program instruction    Education or treatment limitation: None  Rehab Potential:good    LEFS Score  No data recorded    Patient/Family/Caregiver was advised of these findings, precautions, and treatment options and has agreed to actively participate in planning and for this course of care.    Thank you for your referral.  If you have any questions, please contact me at Dept: [phone]    Sincerely,  Electronically signed by therapist: Leonardo Yates, PT         21st Century Cures Act Notice to Patient: Medical documents like this are made available to patients in the interest of transparency. However, be advised this is a medical document and it is intended as [id number] communication between your medical providers. This medical document may contain abbreviations, assessments, medical data,  and results or other terms that are unfamiliar. Medical documents are intended to carry relevant information, facts as evident, and the clinical opinion of the practitioner. As such, this medical document may be written in language that appears blunt or direct. You are encouraged to contact your medical provider and/or [facility] Patient Experience if you have any questions about this medical document.

## (undated) NOTE — LETTER
4/14/2025          To Whom It May Concern:    Kristen Hooks is currently under my medical care and is being followed after left Achilles tendon repair.  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  She may be released to 12-hour shifts next week (4/21/25).  Please reach out to our office with any additional questions or concerns.        Sincerely,    Alexei Kang DPM          Document generated by:  Alexei Kang DPM

## (undated) NOTE — LETTER
11/29/2024          To Whom It May Concern:    Kristen Hooks is currently under my medical care and is clear to start physical therapy. She has an estimated return to work date of January 15, 2025.    If you require additional information please contact our office.        Sincerely,    LATOSHA Alvarez           Document generated by:  LATOSHA Alvarez